# Patient Record
Sex: MALE | Race: WHITE | Employment: FULL TIME | URBAN - METROPOLITAN AREA
[De-identification: names, ages, dates, MRNs, and addresses within clinical notes are randomized per-mention and may not be internally consistent; named-entity substitution may affect disease eponyms.]

---

## 2017-03-29 ENCOUNTER — TELEPHONE (OUTPATIENT)
Dept: FAMILY MEDICINE CLINIC | Age: 52
End: 2017-03-29

## 2017-03-29 DIAGNOSIS — Z13.29 SCREENING FOR ENDOCRINE, METABOLIC AND IMMUNITY DISORDER: Primary | ICD-10-CM

## 2017-03-29 DIAGNOSIS — Z13.228 SCREENING FOR ENDOCRINE, METABOLIC AND IMMUNITY DISORDER: Primary | ICD-10-CM

## 2017-03-29 DIAGNOSIS — Z13.0 SCREENING FOR ENDOCRINE, METABOLIC AND IMMUNITY DISORDER: Primary | ICD-10-CM

## 2017-06-17 LAB
ALBUMIN SERPL-MCNC: 4.7 G/DL (ref 3.5–5.5)
ALBUMIN/GLOB SERPL: 2 {RATIO} (ref 1.2–2.2)
ALP SERPL-CCNC: 58 IU/L (ref 39–117)
ALT SERPL-CCNC: 21 IU/L (ref 0–44)
AST SERPL-CCNC: 17 IU/L (ref 0–40)
BILIRUB SERPL-MCNC: 0.3 MG/DL (ref 0–1.2)
BUN SERPL-MCNC: 17 MG/DL (ref 6–24)
BUN/CREAT SERPL: 17 (ref 9–20)
CALCIUM SERPL-MCNC: 9.9 MG/DL (ref 8.7–10.2)
CHLORIDE SERPL-SCNC: 101 MMOL/L (ref 96–106)
CHOLEST SERPL-MCNC: 209 MG/DL (ref 100–199)
CO2 SERPL-SCNC: 24 MMOL/L (ref 18–29)
CREAT SERPL-MCNC: 1 MG/DL (ref 0.76–1.27)
ERYTHROCYTE [DISTWIDTH] IN BLOOD BY AUTOMATED COUNT: 13.6 % (ref 12.3–15.4)
GLOBULIN SER CALC-MCNC: 2.3 G/DL (ref 1.5–4.5)
GLUCOSE SERPL-MCNC: 84 MG/DL (ref 65–99)
HCT VFR BLD AUTO: 43.5 % (ref 37.5–51)
HDLC SERPL-MCNC: 54 MG/DL
HGB BLD-MCNC: 15.4 G/DL (ref 12.6–17.7)
INTERPRETATION, 910389: NORMAL
LDLC SERPL CALC-MCNC: 129 MG/DL (ref 0–99)
MCH RBC QN AUTO: 29.8 PG (ref 26.6–33)
MCHC RBC AUTO-ENTMCNC: 35.4 G/DL (ref 31.5–35.7)
MCV RBC AUTO: 84 FL (ref 79–97)
PLATELET # BLD AUTO: 254 X10E3/UL (ref 150–379)
POTASSIUM SERPL-SCNC: 5 MMOL/L (ref 3.5–5.2)
PROT SERPL-MCNC: 7 G/DL (ref 6–8.5)
PSA SERPL-MCNC: 0.2 NG/ML (ref 0–4)
RBC # BLD AUTO: 5.16 X10E6/UL (ref 4.14–5.8)
REFLEX CRITERIA: NORMAL
SODIUM SERPL-SCNC: 142 MMOL/L (ref 134–144)
TRIGL SERPL-MCNC: 130 MG/DL (ref 0–149)
VLDLC SERPL CALC-MCNC: 26 MG/DL (ref 5–40)
WBC # BLD AUTO: 5.1 X10E3/UL (ref 3.4–10.8)

## 2017-06-20 ENCOUNTER — OFFICE VISIT (OUTPATIENT)
Dept: FAMILY MEDICINE CLINIC | Age: 52
End: 2017-06-20

## 2017-06-20 VITALS
BODY MASS INDEX: 29.29 KG/M2 | OXYGEN SATURATION: 97 % | HEART RATE: 70 BPM | WEIGHT: 221 LBS | HEIGHT: 73 IN | DIASTOLIC BLOOD PRESSURE: 83 MMHG | RESPIRATION RATE: 14 BRPM | SYSTOLIC BLOOD PRESSURE: 143 MMHG | TEMPERATURE: 97.4 F

## 2017-06-20 DIAGNOSIS — K64.4 EXTERNAL HEMORRHOIDS: ICD-10-CM

## 2017-06-20 DIAGNOSIS — Z00.00 ROUTINE GENERAL MEDICAL EXAMINATION AT A HEALTH CARE FACILITY: Primary | ICD-10-CM

## 2017-06-20 DIAGNOSIS — E78.5 HYPERLIPIDEMIA, UNSPECIFIED HYPERLIPIDEMIA TYPE: ICD-10-CM

## 2017-06-20 DIAGNOSIS — Z23 ENCOUNTER FOR IMMUNIZATION: ICD-10-CM

## 2017-06-20 DIAGNOSIS — Z86.79 HISTORY OF ATRIAL FIBRILLATION: ICD-10-CM

## 2017-06-20 NOTE — MR AVS SNAPSHOT
Visit Information Date & Time Provider Department Dept. Phone Encounter #  
 6/20/2017  2:00 PM Michael Robert  W [facility] [phone] [id number] Follow-up Instructions Return in about 1 year (around 6/20/2018) for Annual Exam - 30 minutes. Upcoming Health Maintenance Date Due DTaP/Tdap/Td series (1 - Tdap) 4/6/1987 INFLUENZA AGE 9 TO ADULT 8/1/2017 COLONOSCOPY 8/9/2021 Allergies as of 6/20/2017  Review Complete On: 6/20/2017 By: Michael Robert NP No Known Allergies Current Immunizations  Reviewed on 6/20/2017 Name Date Tdap  Incomplete Reviewed by Wayne Deng LPN on 4/55/1196 at  2:19 PM  
You Were Diagnosed With   
  
 Codes Comments Routine general medical examination at a health care facility    -  Primary ICD-10-CM: Z00.00 ICD-9-CM: V70.0 History of atrial fibrillation     ICD-10-CM: Z86.79 
ICD-9-CM: V12.59 Hyperlipidemia, unspecified hyperlipidemia type     ICD-10-CM: E78.5 ICD-9-CM: 272.4 External hemorrhoids     ICD-10-CM: K64.4 ICD-9-CM: 455.3 Encounter for immunization     ICD-10-CM: K15 ICD-9-CM: V03.89 Vitals BP Pulse Temp Resp Height(growth percentile) Weight(growth percentile) 143/83 (BP 1 Location: Left arm, BP Patient Position: Sitting) 70 97.4 °F (36.3 °C) (Oral) 14 6' 0.5\" (1.842 m) 221 lb (100.2 kg) SpO2 BMI Smoking Status 97% 29.56 kg/m2 Light Tobacco Smoker Vitals History BMI and BSA Data Body Mass Index Body Surface Area  
 29.56 kg/m 2 2.26 m 2 Preferred Pharmacy Pharmacy Name Phone [id number] - [place], [address] [facility] [phone] Your Updated Medication List  
  
   
This list is accurate as of: 6/20/17  3:01 PM.  Always use your most recent med list.  
  
  
  
  
 clonazePAM 0.5 mg tablet Commonly known as:  Vincent Wright  
 Take 1 Tab by mouth nightly as needed. Max Daily Amount: 0.5 mg.  
  
 finasteride 1 mg tablet Commonly known as:  Benito [place] Take 1 Tab by mouth daily. fluticasone 50 mcg/actuation nasal spray Commonly known as:  FLONASE  
2 sprays each nostril BID for 3 days then 2 sprays each nostril daily through summer  
  
 loratadine 10 mg tablet Commonly known as:  Vonna Hedger Take 10 mg by mouth.  
  
 multivitamin tablet Commonly known as:  ONE A DAY Take 1 tablet by mouth daily. naproxen 500 mg tablet Commonly known as:  NAPROSYN Take 1 Tab by mouth two (2) times daily (with meals). Take BID for 3 days upon start of pain then PRN thereafter We Performed the Following REFERRAL TO COLON AND RECTAL SURGERY [REF17 Custom] Comments:  
 Please evaluate patient for external hemorrhoids. TETANUS, DIPHTHERIA TOXOIDS AND ACELLULAR PERTUSSIS VACCINE (TDAP), IN INDIVIDS. >=7, IM [id number] CPT(R)] Follow-up Instructions Return in about 1 year (around 6/20/2018) for Annual Exam - 30 minutes. Referral Information Referral ID Referred By Referred To  
  
 [id number] [facility]   
   [address] [facility] [address] [place],, [address] Visits Status Start Date End Date 1 New Request 6/20/17 6/20/18 If your referral has a status of pending review or denied, additional information will be sent to support the outcome of this decision. Patient Instructions Well Visit, Men 48 to 72: Care Instructions Your Care Instructions Physical exams can help you stay healthy. Your doctor has checked your overall health and may have suggested ways to take good care of yourself. He or she also may have recommended tests. At home, you can help prevent illness with healthy eating, regular exercise, and other steps. Follow-up care is a key part of your treatment and safety.  Be sure to make and go to all appointments, and call your doctor if you are having problems. It's also a good idea to know your test results and keep a list of the medicines you take. How can you care for yourself at home? · Reach and stay at a healthy weight. This will lower your risk for many problems, such as obesity, diabetes, heart disease, and high blood pressure. · Get at least 30 minutes of exercise on most days of the week. Walking is a good choice. You also may want to do other activities, such as running, swimming, cycling, or playing tennis or team sports. · Do not smoke. Smoking can make health problems worse. If you need help quitting, talk to your doctor about stop-smoking programs and medicines. These can increase your chances of quitting for good. · Protect your skin from too much sun. When you're outdoors from 10 a.m. to 4 p.m., stay in the shade or cover up with clothing and a hat with a wide brim. Wear sunglasses that block UV rays. Even when it's cloudy, put broad-spectrum sunscreen (SPF 30 or higher) on any exposed skin. · See a dentist one or two times a year for checkups and to have your teeth cleaned. · Wear a seat belt in the car. · Limit alcohol to 2 drinks a day. Too much alcohol can cause health problems. Follow your doctor's advice about when to have certain tests. These tests can spot problems early. · Cholesterol. Your doctor will tell you how often to have this done based on your overall health and other things that can increase your risk for heart attack and stroke. · Blood pressure. Have your blood pressure checked during a routine doctor visit. Your doctor will tell you how often to check your blood pressure based on your age, your blood pressure results, and other factors. · Prostate exam. Talk to your doctor about whether you should have a blood test (called a PSA test) for prostate cancer.  Experts disagree on whether men should have this test. Some experts recommend that you discuss the benefits and risks of the test with your doctor. · Diabetes. Ask your doctor whether you should have tests for diabetes. · Vision. Some experts recommend that you have yearly exams for glaucoma and other age-related eye problems starting at age 48. · Hearing. Tell your doctor if you notice any change in your hearing. You can have tests to find out how well you hear. · Colon cancer. You should begin tests for colon cancer at age 48. You may have one of several tests. Your doctor will tell you how often to have tests based on your age and risk. Risks include whether you already had a precancerous polyp removed from your colon or whether your parent, brother, sister, or child has had colon cancer. · Heart attack and stroke risk. At least every 4 to 6 years, you should have your risk for heart attack and stroke assessed. Your doctor uses factors such as your age, blood pressure, cholesterol, and whether you smoke or have diabetes to show what your risk for a heart attack or stroke is over the next 10 years. · Abdominal aortic aneurysm. Ask your doctor whether you should have a test to check for an aneurysm. You may need a test if you ever smoked or if your parent, brother, sister, or child has had an aneurysm. When should you call for help? Watch closely for changes in your health, and be sure to contact your doctor if you have any problems or symptoms that concern you. Where can you learn more? Go to http://abel-pretty.info/. Enter U715 in the search box to learn more about \"Well Visit, Men 48 to 72: Care Instructions. \" Current as of: July 19, 2016 Content Version: 11.3 © 4862-6474 [employer]. Care instructions adapted under license by [employer] (which disclaims liability or warranty for this information).  If you have questions about a medical condition or this instruction, always ask your healthcare professional. [address] any warranty or liability for your use of this information. Introducing \[facility] [place] [facility]\"" & HEALTH SERVICES! Dear [place]: 
Thank you for requesting a ActualMeds account. Our records indicate that you already have an active ActualMeds account. You can access your account anytime at [email or web address] Did you know that you can access your hospital and ER discharge instructions at any time in ActualMeds? You can also review all of your test results from your hospital stay or ER visit. Additional Information If you have questions, please visit the Frequently Asked Questions section of the ActualMeds website at [email or web address] Remember, ActualMeds is NOT to be used for urgent needs. For medical emergencies, dial 911. Now available from your iPhone and Android! Please provide this summary of care documentation to your next provider. Your primary care clinician is listed as [facility]. If you have any questions after today's visit, please call [phone].

## 2017-06-20 NOTE — PATIENT INSTRUCTIONS

## 2017-06-20 NOTE — PROGRESS NOTES
Angie Zheng is a 47 y.o. male who was seen in clinic today (6/20/2017). Subjective:  Cardiovascular Review:  The patient has history of Atrial Fibrillation. Underwent ablation in 1991. Patient not currently seen by cardiology. Diet and Lifestyle: generally follows a low fat low cholesterol diet, generally follows a low sodium diet, sedentary  Home BP Monitoring: is not measured at home. Pertinent ROS: taking medications as instructed, no medication side effects noted, no TIA's, no chest pain on exertion, no dyspnea on exertion, no swelling of ankles. Colonoscopy in 2016 with repeat in 5 years. Prior to Admission medications    Medication Sig Start Date End Date Taking? Authorizing Provider   loratadine (CLARITIN) 10 mg tablet Take 10 mg by mouth. Yes Historical Provider   fluticasone (FLONASE) 50 mcg/actuation nasal spray 2 sprays each nostril BID for 3 days then 2 sprays each nostril daily through summer 6/14/16  Yes Vivia Riedel, DO   finasteride (PROPECIA) 1 mg tablet Take 1 Tab by mouth daily. 6/14/16  Yes Vivia Riedel, DO   clonazePAM (KLONOPIN) 0.5 mg tablet Take 1 Tab by mouth nightly as needed. Max Daily Amount: 0.5 mg. 10/15/15  Yes Vivia Riedel, DO   naproxen (NAPROSYN) 500 mg tablet Take 1 Tab by mouth two (2) times daily (with meals). Take BID for 3 days upon start of pain then PRN thereafter 2/12/15  Yes Vivia Riedel, DO   multivitamin (ONE A DAY) tablet Take 1 tablet by mouth daily. Yes Historical Provider          No Known Allergies        Review of Systems   Constitutional: Negative for malaise/fatigue and weight loss. Respiratory: Negative for shortness of breath. Cardiovascular: Negative for chest pain, palpitations and leg swelling. Gastrointestinal: Negative for heartburn. Musculoskeletal: Negative for back pain, joint pain and myalgias. Neurological: Negative for dizziness, weakness and headaches.    Psychiatric/Behavioral: Negative for depression. Objective:   Physical Exam   Constitutional: He is oriented to person, place, and time. He appears well-developed and well-nourished. No distress. HENT:   Right Ear: Tympanic membrane and ear canal normal.   Left Ear: Tympanic membrane and ear canal normal.   Nose: No mucosal edema. Right sinus exhibits no maxillary sinus tenderness and no frontal sinus tenderness. Left sinus exhibits no maxillary sinus tenderness and no frontal sinus tenderness. Mouth/Throat: Oropharynx is clear and moist.   Eyes: EOM are normal. Pupils are equal, round, and reactive to light. Neck: Normal range of motion. Neck supple. No JVD present. Carotid bruit is not present. No thyromegaly present. Cardiovascular: Normal rate, regular rhythm, normal heart sounds and intact distal pulses. Exam reveals no gallop and no friction rub. No murmur heard. Pulmonary/Chest: Effort normal and breath sounds normal. No respiratory distress. He has no decreased breath sounds. He has no wheezes. He has no rhonchi. Abdominal: Soft. Bowel sounds are normal. He exhibits no distension. There is no tenderness. Musculoskeletal: He exhibits no edema. Lymphadenopathy:     He has no cervical adenopathy. Neurological: He is alert and oriented to person, place, and time. Psychiatric: He has a normal mood and affect. His behavior is normal.   Nursing note and vitals reviewed. Visit Vitals    /83 (BP 1 Location: Left arm, BP Patient Position: Sitting)    Pulse 70    Temp 97.4 °F (36.3 °C) (Oral)    Resp 14    Ht 6' 0.5\" (1.842 m)    Wt 221 lb (100.2 kg)    SpO2 97%    BMI 29.56 kg/m2       Assessment & Plan:  Rere Larry was seen today for well male and labs.     Diagnoses and all orders for this visit:    Routine general medical examination at a health care facility  Well adult, reviewed routine screenings as well as healthy diet and lifestyle practices    History of atrial fibrillation  Asymptomatic but patient requests follow up cardiac exam.   -     REFERRAL TO CARDIOLOGY    Hyperlipidemia, unspecified hyperlipidemia type  ACC/AHA 10 year CVD risk calculation: 4.6%. Statin therapy not indicated. Reviewed diet and lifestyle changes. External hemorrhoids  -     REFERRAL TO COLON AND RECTAL SURGERY    Encounter for immunization  -     TETANUS, DIPHTHERIA TOXOIDS AND ACELLULAR PERTUSSIS VACCINE (TDAP), IN INDIVIDS. >=7, IM      I have discussed the diagnosis with the patient and the intended plan as seen in the above orders. The patient has received an after-visit summary along with patient information handout. I have discussed medication side effects and warnings with the patient as well. Follow-up Disposition:  Return in about 1 year (around 6/20/2018) for Annual Exam - 30 minutes.         Darci Berman NP

## 2017-06-20 NOTE — PROGRESS NOTES
Chief Complaint   Patient presents with    Well Male   [facility]     were done 2017     Identified pt with two pt identifiers (Name @ )    1. Have you been to the ER, urgent care clinic since your last visit? Hospitalized since your last visit? No    2. Have you seen or consulted any other health care providers outside of the [address] [place] [address] since your last visit? Include any pap smears or colon screening.  No     \"REVIEWED RECORD IN PREPARATION FOR VISIT AND HAVE OBTAINED NECESSARY DOCUMENTATION\"

## 2017-06-30 RX ORDER — FINASTERIDE 1 MG/1
TABLET, FILM COATED ORAL
Qty: 90 TAB | Refills: 2 | Status: SHIPPED | OUTPATIENT
Start: 2017-06-30 | End: 2018-06-25 | Stop reason: SDUPTHER

## 2017-06-30 NOTE — TELEPHONE ENCOUNTER
----- Message from Macie Tena sent at 6/29/2017  6:18 PM EDT -----  Regarding: Tito Bangura / refill  Pt is trying to get a refill of his  \"propecia\" to be sent to the [address] on [facility] and pt best contact number is  [phone].

## 2017-07-21 ENCOUNTER — OFFICE VISIT (OUTPATIENT)
Dept: CARDIOLOGY CLINIC | Age: 52
End: 2017-07-21

## 2017-07-21 VITALS
HEART RATE: 47 BPM | HEIGHT: 73 IN | DIASTOLIC BLOOD PRESSURE: 80 MMHG | SYSTOLIC BLOOD PRESSURE: 120 MMHG | OXYGEN SATURATION: 98 % | WEIGHT: 222 LBS | BODY MASS INDEX: 29.42 KG/M2

## 2017-07-21 DIAGNOSIS — I47.1 PSVT (PAROXYSMAL SUPRAVENTRICULAR TACHYCARDIA) (HCC): ICD-10-CM

## 2017-07-21 DIAGNOSIS — R06.09 DOE (DYSPNEA ON EXERTION): Primary | ICD-10-CM

## 2017-07-21 DIAGNOSIS — Z98.890 S/P ABLATION OF ACCESSORY BYPASS TRACT: ICD-10-CM

## 2017-07-21 DIAGNOSIS — R00.2 PALPITATIONS: ICD-10-CM

## 2017-07-21 DIAGNOSIS — R06.09 DOE (DYSPNEA ON EXERTION): ICD-10-CM

## 2017-07-21 DIAGNOSIS — E78.5 HYPERLIPIDEMIA, UNSPECIFIED HYPERLIPIDEMIA TYPE: ICD-10-CM

## 2017-07-21 DIAGNOSIS — I51.7 LVH (LEFT VENTRICULAR HYPERTROPHY): ICD-10-CM

## 2017-07-21 NOTE — PATIENT INSTRUCTIONS
A 30 day loop monitor has been ordered for you. This will be mailed to the address given to us. Please read over the instructions given to you about [employer] loop monitors. If you have any insurance questions regarding coverage and out of pocket cost please contact the number on the instructions. You will be scheduled for an echo. You will need to follow up in clinic with Dr. Reed Mclaughlin in 1 months.

## 2017-07-21 NOTE — PROGRESS NOTES
Cardiac Electrophysiology Consultation Note     Subjective:      Maire Schwab. is a 47 y.o. male patient who is seen for to establish cardiac care he has a hx of SVT & ablation kindly referred by Nasra Doyle NP. He was dx with SVT when he was 16 yo. He then started having daily episodes of SVT once he turned 29 yo. He then had an ablation of an accessory pathway in [place], [place] at [facility] . He was told he does not have WPW. Pmhx includes SVT ablation Jan 1995, hyperlipidemia. PSH tonsillectomy, hand surgery. He says he has always had a low heart rate. Nuclear stress test prior to the ablation   He denies DM, hypertension. He used to take simvastatin, but he stopped taking it - no particular reason  He does not have the episodes of the fast heart rates anymore since the ablation. He occasionally has symptoms of palpitations lasting a couple seconds. He works out doing physical work, he notices when he is sitting down or bending over and stands back up he will get dizzy. He says he does get SOB quickly when he is digging. No syncope. 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 Drinks \"alot of water\". No tobacco, quit smoking 10 years ago. He has an \"occasional cigarette\". , 24 yo son and 24 yo daughter. Family hx: Father CABG at age 79 yo (very healthy). Patient Active Problem List    Diagnosis Date Noted    Palpitations 07/21/2017    Serrated adenoma of colon 08/23/2016    Colon polyp 08/10/2016    Hair thinning 12/04/2014    Hyperlipidemia 12/04/2014    Allergic rhinitis 12/04/2014    History of anxiety disorder 12/04/2014    Insomnia 12/04/2014     Current Outpatient Prescriptions   Medication Sig Dispense Refill    UBIDECARENONE (COQ-10 PO) Take 200 mg by mouth daily.  finasteride (PROPECIA) 1 mg tablet TAKE 1 TAB BY MOUTH DAILY. 90 Tab 2    loratadine (CLARITIN) 10 mg tablet Take 10 mg by mouth.       fluticasone (FLONASE) 50 mcg/actuation nasal spray 2 sprays each nostril BID for 3 days then 2 sprays each nostril daily through summer 1 Bottle 2    clonazePAM (KLONOPIN) 0.5 mg tablet Take 1 Tab by mouth nightly as needed. Max Daily Amount: 0.5 mg. 30 Tab 0    naproxen (NAPROSYN) 500 mg tablet Take 1 Tab by mouth two (2) times daily (with meals). Take BID for 3 days upon start of pain then PRN thereafter 60 Tab 0    multivitamin (ONE A DAY) tablet Take 1 tablet by mouth daily. No Known Allergies  Past Medical History:   Diagnosis Date    Arrhythmia     Arthritis     Chronic pain     Contact dermatitis and other eczema, due to unspecified cause     GERD (gastroesophageal reflux disease)     Herniated disc, cervical     Hypercholesterolemia      Past Surgical History:   Procedure Laterality Date    CARDIAC SURG PROCEDURE UNLIST      HX AFIB ABLATION      HX ORTHOPAEDIC      pins in R hand     HX TONSILLECTOMY       Family History   Problem Relation Age of Onset    Arthritis-osteo Mother     Heart Disease Father     Cancer Father      bladder     Heart Disease Paternal Grandfather      Social History   Substance Use Topics    Smoking status: Light Tobacco Smoker    Smokeless tobacco: Never Used      Comment: occasional cigar or cigarette    Alcohol use Yes      Comment: occasionally         Review of Systems:   Constitutional: Negative for fever, chills, weight loss, +malaise/fatigue. HEENT: Negative for nosebleeds, vision changes. Respiratory: Negative for cough, hemoptysis, sputum production, and wheezing. Cardiovascular: Negative for chest pain, palpitations, orthopnea, claudication, leg swelling, syncope, and PND. + SOB  Gastrointestinal: Negative for nausea, vomiting, diarrhea, constipation, blood in stool and melena. Genitourinary: Negative for dysuria, and hematuria. Musculoskeletal: Negative for myalgias, arthralgia. Skin: Negative for rash. Heme: Does not bleed or bruise easily. Neurological: Negative for speech change and focal weakness     Objective:     Visit Vitals    /80 (BP 1 Location: Left arm, BP Patient Position: Sitting)    Pulse (!) 47    Ht 6' 0.5\" (1.842 m)    Wt 222 lb (100.7 kg)    SpO2 98%    BMI 29.69 kg/m2      Physical Exam:   Constitutional: well-nourished. No distress. Head: Normocephalic and atraumatic. Eyes: Pupils are equal, round  Neck: supple. No JVD present. Cardiovascular: slow rate, regular rhythm. Exam reveals no gallop and no friction rub. No murmur heard. Pulmonary/Chest: Effort normal and breath sounds normal. No wheezes. Abdominal: Soft, no tenderness. Musculoskeletal: no edema. Neurological: alert,oriented. Skin: Skin is warm and dry  Psychiatric: normal mood and affect. Behavior is normal. Judgment and thought content normal.      EKG: Marked sinus  Bradycardia   Voltage criteria for LVH    -Poor R-wave progression        Assessment/Plan:       ICD-10-CM ICD-9-CM    1. DANIEL (dyspnea on exertion) R06.09 786.09    2. S/P ablation of accessory bypass tract Z98.890 V45.89    3. Hyperlipidemia, unspecified hyperlipidemia type E78.5 272.4    4. LVH (left ventricular hypertrophy) I51.7 429.3    5. Palpitations R00.2 785.1 AMB POC EKG ROUTINE W/ 12 LEADS, INTER & REP     Will try to obtain records from [facility] is [place] not sure if they will still have these since it was in 1994/1995. Recommend stress echo d/t DANIEL. Will need to assess for IHSS, LVEF and if he is able to appropriately increase his HR. Follow up     Thank you for involving me in this patient's care and please call with further concerns or questions. Tomás Martin M.D.   Electrophysiology/Cardiology  [facility] [place]  [address], [place] [address] , [address]  [address]  [phone]

## 2017-07-21 NOTE — MR AVS SNAPSHOT
Visit Information Date & Time Provider Department Dept. Phone Encounter #  
 7/21/2017  9:00 AM Leon Lanza MD [facility] [place]ar [phone] [id number] Upcoming Health Maintenance Date Due INFLUENZA AGE 9 TO ADULT 8/1/2017 COLONOSCOPY 8/9/2021 DTaP/Tdap/Td series (2 - Td) 6/20/2027 Allergies as of 7/21/2017  Review Complete On: 7/21/2017 By: Leon Lanza MD  
 No Known Allergies Current Immunizations  Reviewed on 6/20/2017 Name Date Tdap 6/20/2017 Not reviewed this visit You Were Diagnosed With   
  
 Codes Comments DANIEL (dyspnea on exertion)    -  Primary ICD-10-CM: R06.09 
ICD-9-CM: 786.09 S/P ablation of accessory bypass tract     ICD-10-CM: Z98.890 ICD-9-CM: V45.89 Hyperlipidemia, unspecified hyperlipidemia type     ICD-10-CM: E78.5 ICD-9-CM: 272.4 LVH (left ventricular hypertrophy)     ICD-10-CM: I51.7 ICD-9-CM: 429.3 Palpitations     ICD-10-CM: R00.2 ICD-9-CM: 785.1 Vitals BP Pulse Height(growth percentile) Weight(growth percentile) SpO2 BMI  
 120/80 (BP 1 Location: Left arm, BP Patient Position: Sitting) (!) 47 6' 0.5\" (1.842 m) 222 lb (100.7 kg) 98% 29.69 kg/m2 Smoking Status Light Tobacco Smoker Vitals History BMI and BSA Data Body Mass Index Body Surface Area  
 29.69 kg/m 2 2.27 m 2 Preferred Pharmacy Pharmacy Name Phone [facility][id number] - [address] [phone] Your Updated Medication List  
  
   
This list is accurate as of: 7/21/17  9:59 AM.  Always use your most recent med list.  
  
  
  
  
 clonazePAM 0.5 mg tablet Commonly known as:  Marci Cord Take 1 Tab by mouth nightly as needed. Max Daily Amount: 0.5 mg. COQ-10 PO Take 200 mg by mouth daily. finasteride 1 mg tablet Commonly known as:  Inetta Cassia TAKE 1 TAB BY MOUTH DAILY. fluticasone 50 mcg/actuation nasal spray Commonly known as:  FLONASE  
2 sprays each nostril BID for 3 days then 2 sprays each nostril daily through summer  
  
 loratadine 10 mg tablet Commonly known as:  Leonel Means Take 10 mg by mouth.  
  
 multivitamin tablet Commonly known as:  ONE A DAY Take 1 tablet by mouth daily. naproxen 500 mg tablet Commonly known as:  NAPROSYN Take 1 Tab by mouth two (2) times daily (with meals). Take BID for 3 days upon start of pain then PRN thereafter We Performed the Following AMB POC EKG ROUTINE W/ 12 LEADS, INTER & REP [67836 CPT(R)] To-Do List   
 07/21/2017 ECHO:  ECHO TTE STRESS EXRCSE COMP W OR WO CONTR   
  
 07/21/2017 Cardiac Services:  LOOP MONITOR Patient Instructions A 30 day loop monitor has been ordered for you. This will be mailed to the address given to us. Please read over the instructions given to you about [employer] loop monitors. If you have any insurance questions regarding coverage and out of pocket cost please contact the number on the instructions. You will be scheduled for an echo. You will need to follow up in clinic with Dr. Nelli Zavala in 1 months. Introducing [facility]! Dear Rivka Gillis: 
Thank you for requesting a [employer] account. Our records indicate that you already have an active [employer] account. You can access your account anytime at [email or web address] Did you know that you can access your hospital and ER discharge instructions at any time in [employer]? You can also review all of your test results from your hospital stay or ER visit. Additional Information If you have questions, please visit the Frequently Asked Questions section of the [employer] website at [email or web address] Remember, [employer] is NOT to be used for urgent needs. For medical emergencies, dial 911. Now available from your iPhone and Android! Please provide this summary of care documentation to your next provider. Your primary care clinician is listed as Sujatha Pierce. If you have any questions after today's visit, please call [phone].

## 2017-07-22 NOTE — PROGRESS NOTES
Cardiac Electrophysiology Office Consultation Note     Subjective:      Ruben Fortune. is a 47 y.o. male patient who is seen for to establish cardiac care he has a hx of SVT & ablation   He is kindly referred by Crispin Schumacher NP. He was dx with SVT when he was 16 yo. He then started having daily episodes of SVT once he turned 29 yo. He then had an ablation of an accessory pathway in [place], [place] at [facility] . He was told he does not have WPW. Pmhx includes SVT ablation Jan 1995, hyperlipidemia. PSH tonsillectomy, hand surgery. He says he has always had a low heart rate. Nuclear stress test prior to the ablation   He denies DM, hypertension. He used to take simvastatin, but he stopped taking it - no particular reason  He does not have the episodes of the fast heart rates anymore since the ablation. He occasionally has symptoms of palpitations lasting a couple seconds. He works out doing physical work, he notices when he is sitting down or bending over and stands back up he will get dizzy. He says he does get SOB quickly when he is digging. No syncope. 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 Drinks \"alot of water\". No tobacco, quit smoking 10 years ago. He has an \"occasional cigarette\". , 26 yo son and 22 yo daughter. Family hx: Father CABG at age 77 yo (very healthy). Patient Active Problem List    Diagnosis Date Noted    Palpitations 07/21/2017    Serrated adenoma of colon 08/23/2016    Colon polyp 08/10/2016    Hair thinning 12/04/2014    Hyperlipidemia 12/04/2014    Allergic rhinitis 12/04/2014    History of anxiety disorder 12/04/2014    Insomnia 12/04/2014     Current Outpatient Prescriptions   Medication Sig Dispense Refill    UBIDECARENONE (COQ-10 PO) Take 200 mg by mouth daily.  finasteride (PROPECIA) 1 mg tablet TAKE 1 TAB BY MOUTH DAILY. 90 Tab 2    loratadine (CLARITIN) 10 mg tablet Take 10 mg by mouth.       fluticasone (FLONASE) 50 mcg/actuation nasal spray 2 sprays each nostril BID for 3 days then 2 sprays each nostril daily through summer 1 Bottle 2    clonazePAM (KLONOPIN) 0.5 mg tablet Take 1 Tab by mouth nightly as needed. Max Daily Amount: 0.5 mg. 30 Tab 0    naproxen (NAPROSYN) 500 mg tablet Take 1 Tab by mouth two (2) times daily (with meals). Take BID for 3 days upon start of pain then PRN thereafter 60 Tab 0    multivitamin (ONE A DAY) tablet Take 1 tablet by mouth daily. No Known Allergies  Past Medical History:   Diagnosis Date    Arrhythmia     Arthritis     Chronic pain     Contact dermatitis and other eczema, due to unspecified cause     GERD (gastroesophageal reflux disease)     Herniated disc, cervical     Hypercholesterolemia      Past Surgical History:   Procedure Laterality Date    CARDIAC SURG PROCEDURE UNLIST      HX AFIB ABLATION      HX ORTHOPAEDIC      pins in R hand     HX TONSILLECTOMY       Family History   Problem Relation Age of Onset    Arthritis-osteo Mother     Heart Disease Father     Cancer Father      bladder     Heart Disease Paternal Grandfather      Social History   Substance Use Topics    Smoking status: Light Tobacco Smoker    Smokeless tobacco: Never Used      Comment: occasional cigar or cigarette    Alcohol use Yes      Comment: occasionally         Review of Systems:   Constitutional: Negative for fever, chills, weight loss, +malaise/fatigue. HEENT: Negative for nosebleeds, vision changes. Respiratory: Negative for cough, hemoptysis, sputum production, and wheezing. Cardiovascular: Negative for chest pain, + palpitations, no orthopnea, claudication, leg swelling, syncope, and PND. + SOB  Gastrointestinal: Negative for nausea, vomiting, diarrhea, constipation, blood in stool and melena. Genitourinary: Negative for dysuria, and hematuria. Musculoskeletal: Negative for myalgias, arthralgia. Skin: Negative for rash.    Heme: Does not bleed or bruise easily. Neurological: Negative for speech change and focal weakness     Objective:     Visit Vitals    /80 (BP 1 Location: Left arm, BP Patient Position: Sitting)    Pulse (!) 47    Ht 6' 0.5\" (1.842 m)    Wt 222 lb (100.7 kg)    SpO2 98%    BMI 29.69 kg/m2      Physical Exam:   Constitutional: well-nourished. No distress. Head: Normocephalic and atraumatic. Eyes: Pupils are equal, round  Neck: supple. No JVD present. Cardiovascular: slow rate, regular rhythm. Exam reveals no gallop and no friction rub. No murmur heard. Pulmonary/Chest: Effort normal and breath sounds normal. No wheezes. Abdominal: Soft, no tenderness. Musculoskeletal: no edema. Neurological: alert,oriented. Skin: Skin is warm and dry  Psychiatric: normal mood and affect. Behavior is normal. Judgment and thought content normal.      EKG: Marked sinus  Bradycardia   Voltage criteria for LVH    -Poor R-wave progression        Assessment/Plan:       ICD-10-CM ICD-9-CM    1. DANIEL (dyspnea on exertion) R06.09 786.09 LOOP MONITOR      ECHO TTE STRESS EXRCSE COMP W OR WO CONTR   2. S/P ablation of accessory bypass tract Z98.890 V45.89 LOOP MONITOR      ECHO TTE STRESS EXRCSE COMP W OR WO CONTR   3. Hyperlipidemia, unspecified hyperlipidemia type E78.5 272.4 LOOP MONITOR      ECHO TTE STRESS EXRCSE COMP W OR WO CONTR   4. LVH (left ventricular hypertrophy) I51.7 429.3 LOOP MONITOR      ECHO TTE STRESS EXRCSE COMP W OR WO CONTR   5. Palpitations R00.2 785.1 AMB POC EKG ROUTINE W/ 12 LEADS, INTER & REP      LOOP MONITOR      ECHO TTE STRESS EXRCSE COMP W OR WO CONTR     Will try to obtain records from [facility] in [place] not sure if they will still have these since it was in 1994/1995. Recommend stress echo. Will need to assess for IHSS, LVEF and if he is able to appropriately increase his HR.  Need LVOT gradient with exercise  He agrees to proceed  External loop recorder for PVC, NSVT vs PAC/PAT  Follow up 4 weeks    Thank you for involving me in this patient's care and please call with further concerns or questions. Naresh Manuel M.D.   Electrophysiology/Cardiology  [facility] [place]  [facility] [address], [facility] [address] [place] [address] [address]  [phone]

## 2017-07-25 PROBLEM — I47.1 PSVT (PAROXYSMAL SUPRAVENTRICULAR TACHYCARDIA) (HCC): Status: ACTIVE | Noted: 2017-07-25

## 2017-07-25 NOTE — PROGRESS NOTES
Record from [place]: 12/15/1994 EP study and ablation of concealed left posteroseptal pathway  1995 repeat EP study without accessory pathway or SVT

## 2017-07-27 ENCOUNTER — CLINICAL SUPPORT (OUTPATIENT)
Dept: CARDIOLOGY CLINIC | Age: 52
End: 2017-07-27

## 2017-07-27 ENCOUNTER — TELEPHONE (OUTPATIENT)
Dept: CARDIOLOGY CLINIC | Age: 52
End: 2017-07-27

## 2017-07-27 DIAGNOSIS — R00.2 PALPITATIONS: ICD-10-CM

## 2017-07-27 DIAGNOSIS — E78.5 HYPERLIPIDEMIA, UNSPECIFIED HYPERLIPIDEMIA TYPE: ICD-10-CM

## 2017-07-27 DIAGNOSIS — Z98.890 S/P ABLATION OF ACCESSORY BYPASS TRACT: ICD-10-CM

## 2017-07-27 DIAGNOSIS — R06.09 DOE (DYSPNEA ON EXERTION): ICD-10-CM

## 2017-07-27 DIAGNOSIS — I51.7 LVH (LEFT VENTRICULAR HYPERTROPHY): ICD-10-CM

## 2017-07-27 NOTE — TELEPHONE ENCOUNTER
Spoke to pt & he is currently wearing loop monitor & having issues with the electrodes sticking. He sweats a lot & has already had to change them today. Informed to call [employer] to order more & see if they have any that are more adhesive. Pt stated he understands.

## 2017-07-27 NOTE — TELEPHONE ENCOUNTER
Patient states that he has questions about the loop monitor. Requests a call back at [phone]. Thanks!

## 2017-07-28 ENCOUNTER — TELEPHONE (OUTPATIENT)
Dept: CARDIOLOGY CLINIC | Age: 52
End: 2017-07-28

## 2017-07-28 NOTE — PROGRESS NOTES
No ischemia or infarct  LVOT gradient not high  Future Appointments  Date Time Provider Soraya Anguiano   8/31/2017 9:00 AM Destin Guzman  [address]

## 2017-07-28 NOTE — TELEPHONE ENCOUNTER
----- Message from Pernell Mead MD sent at 7/28/2017  4:08 PM EDT -----  No ischemia  Continue follow up and meds  8/31/2017  9:00 AM    Pernell Mead MD            [place] [address]

## 2017-07-28 NOTE — PROGRESS NOTES
No ischemia  Continue follow up and meds  8/31/2017  9:00 AM    Jasmin Marroquin MD            [place] [address]

## 2017-08-09 ENCOUNTER — DOCUMENTATION ONLY (OUTPATIENT)
Dept: CARDIOLOGY CLINIC | Age: 52
End: 2017-08-09

## 2017-08-09 NOTE — PROGRESS NOTES
Loop monitor shows PVC  Current Outpatient Prescriptions on File Prior to Visit   Medication Sig Dispense Refill    UBIDECARENONE (COQ-10 PO) Take 200 mg by mouth daily.  finasteride (PROPECIA) 1 mg tablet TAKE 1 TAB BY MOUTH DAILY. 90 Tab 2    loratadine (CLARITIN) 10 mg tablet Take 10 mg by mouth.  fluticasone (FLONASE) 50 mcg/actuation nasal spray 2 sprays each nostril BID for 3 days then 2 sprays each nostril daily through summer 1 Bottle 2    clonazePAM (KLONOPIN) 0.5 mg tablet Take 1 Tab by mouth nightly as needed. Max Daily Amount: 0.5 mg. 30 Tab 0    naproxen (NAPROSYN) 500 mg tablet Take 1 Tab by mouth two (2) times daily (with meals). Take BID for 3 days upon start of pain then PRN thereafter 60 Tab 0    multivitamin (ONE A DAY) tablet Take 1 tablet by mouth daily. No current facility-administered medications on file prior to visit.       I recommend toprol XL 25 mg every day and follow up  Future Appointments  Date Time Provider Soraya Anguiano   8/31/2017 9:00 AM Ross Lucero  [address]

## 2017-08-10 ENCOUNTER — TELEPHONE (OUTPATIENT)
Dept: CARDIOLOGY CLINIC | Age: 52
End: 2017-08-10

## 2017-08-10 NOTE — PROGRESS NOTES
Verified patient with two types of identifiers. Notified patient of results. He states that he has tried toprol before and did not like the way it made him feel. States that he will take it if he has to. Per Dr Yadi Gomez ok to hold off for now. Made appt for 8/15/17 with Dr Yadi Gomez to discuss. Patient verbalizes understanding. And will call with any questions or concerns.

## 2017-08-15 ENCOUNTER — OFFICE VISIT (OUTPATIENT)
Dept: CARDIOLOGY CLINIC | Age: 52
End: 2017-08-15

## 2017-08-15 VITALS
WEIGHT: 223.8 LBS | BODY MASS INDEX: 29.66 KG/M2 | HEART RATE: 57 BPM | DIASTOLIC BLOOD PRESSURE: 80 MMHG | HEIGHT: 73 IN | SYSTOLIC BLOOD PRESSURE: 130 MMHG | OXYGEN SATURATION: 99 %

## 2017-08-15 DIAGNOSIS — R00.2 PALPITATIONS: ICD-10-CM

## 2017-08-15 DIAGNOSIS — I47.1 PSVT (PAROXYSMAL SUPRAVENTRICULAR TACHYCARDIA) (HCC): ICD-10-CM

## 2017-08-15 DIAGNOSIS — Z98.890 HX OF PRIOR ABLATION TREATMENT: ICD-10-CM

## 2017-08-15 DIAGNOSIS — I49.3 SYMPTOMATIC PVCS: Primary | ICD-10-CM

## 2017-08-15 RX ORDER — METOPROLOL SUCCINATE 25 MG/1
25 TABLET, EXTENDED RELEASE ORAL DAILY
Qty: 30 TAB | Refills: 12 | Status: SHIPPED | OUTPATIENT
Start: 2017-08-15 | End: 2018-08-22

## 2017-08-15 NOTE — MR AVS SNAPSHOT
Visit Information Date & Time Provider Department Dept. Phone Encounter #  
 8/15/2017  3:40 PM Nicole Ibanez MD [facility] Brionna Butler [phone] [id number] Follow-up Instructions Return in about 1 year (around 8/15/2018), or if symptoms worsen or fail to improve. Upcoming Health Maintenance Date Due INFLUENZA AGE 9 TO ADULT 8/1/2017 COLONOSCOPY 8/9/2021 DTaP/Tdap/Td series (2 - Td) 6/20/2027 Allergies as of 8/15/2017  Review Complete On: 8/15/2017 By: Nicole Ibanez MD  
 No Known Allergies Current Immunizations  Reviewed on 6/20/2017 Name Date Tdap 6/20/2017 Not reviewed this visit You Were Diagnosed With   
  
 Codes Comments Symptomatic PVCs    -  Primary ICD-10-CM: I49.3 ICD-9-CM: 427.69 Hx of prior ablation treatment     ICD-10-CM: Z98.890 ICD-9-CM: V15.29 PSVT (paroxysmal supraventricular tachycardia) (HCC)     ICD-10-CM: I47.1 ICD-9-CM: 427.0 Palpitations     ICD-10-CM: R00.2 ICD-9-CM: 785.1 Vitals BP Pulse Height(growth percentile) Weight(growth percentile) SpO2 BMI  
 130/80 (BP 1 Location: Right arm, BP Patient Position: Sitting) (!) 57 6' 0.5\" (1.842 m) 223 lb 12.8 oz (101.5 kg) 99% 29.94 kg/m2 Smoking Status Light Tobacco Smoker Vitals History BMI and BSA Data Body Mass Index Body Surface Area  
 29.94 kg/m 2 2.28 m 2 Preferred Pharmacy Pharmacy Name Phone [facility][id number] - [address] [phone] Your Updated Medication List  
  
   
This list is accurate as of: 8/15/17  4:29 PM.  Always use your most recent med list.  
  
  
  
  
 clonazePAM 0.5 mg tablet Commonly known as:  Aditi Fearing Take 1 Tab by mouth nightly as needed. Max Daily Amount: 0.5 mg. COQ-10 PO Take 200 mg by mouth daily. finasteride 1 mg tablet Commonly known as:  Reino Piano TAKE 1 TAB BY MOUTH DAILY. fluticasone 50 mcg/actuation nasal spray Commonly known as:  FLONASE  
2 sprays each nostril BID for 3 days then 2 sprays each nostril daily through summer  
  
 loratadine 10 mg tablet Commonly known as:  Sabrina Arlington Take 10 mg by mouth.  
  
 metoprolol succinate 25 mg XL tablet Commonly known as:  TOPROL-XL Take 1 Tab by mouth daily. multivitamin tablet Commonly known as:  ONE A DAY Take 1 tablet by mouth daily. naproxen 500 mg tablet Commonly known as:  NAPROSYN Take 1 Tab by mouth two (2) times daily (with meals). Take BID for 3 days upon start of pain then PRN thereafter Prescriptions Sent to Pharmacy Refills  
 metoprolol succinate (TOPROL-XL) 25 mg XL tablet 12 Sig: Take 1 Tab by mouth daily. Class: Normal  
 Pharmacy: [address] [place] [address] #: [phone] Route: Oral  
  
Follow-up Instructions Return in about 1 year (around 8/15/2018), or if symptoms worsen or fail to improve. Patient Instructions You will need to follow up in clinic with Dr. Chelo Mcqueen in 1 year. Introducing [facility]! Dear Maryam: 
Thank you for requesting a [employer] account. Our records indicate that you already have an active [employer] [facility] account. You can access your account anytime at [email or web address] Did you know that you can access your hospital and ER discharge instructions at any time in [facility]? You can also review all of your test results from your hospital stay or ER visit. Additional Information If you have questions, please visit the Frequently Asked Questions section of the [employer] website at [email or web address] Remember, [facility] is NOT to be used for urgent needs. For medical emergencies, dial 911. Now available from your iPhone and Android! Please provide this summary of care documentation to your next provider. Your primary care clinician is listed as Daina Dee. If you have any questions after today's visit, please call [phone].

## 2017-08-15 NOTE — PROGRESS NOTES
.       Cardiac Electrophysiology Office Consultation Note     Subjective:      Delmy Esquivel. is a 47 y.o. male patient who is seen for to establish cardiac care he has a hx of SVT & ablation   He is kindly referred by Lucy Mcgrath NP. He was dx with SVT when he was 16 yo. He then started having daily episodes of SVT once he turned 27 yo. He then had an ablation of an accessory pathway in [place], [place] at [facility] . Record from [place]: 12/15/1994 EP study and ablation of concealed left posteroseptal pathway  1995 repeat EP study without accessory pathway or SVT  He was told he does not have WPW. Pmhx includes SVT ablation Jan 1995, hyperlipidemia. PSH tonsillectomy, hand surgery. He says he has always had a low heart rate. Nuclear stress test prior to the ablation   He denies DM, hypertension. He used to take simvastatin, but he stopped taking it - no particular reason  He does not have the episodes of the fast heart rates anymore since the ablation. He occasionally has symptoms of palpitations lasting a couple seconds. He works out doing physical work, he notices when he is sitting down or bending over and stands back up he will get dizzy. He says he does get SOB quickly when he is digging. No syncope. Interim hx:  He is here today for follow up. He wore a external loop monitor that showed PVC. He had a normal stress echo, normal LVEF. Anant Ball He was only able to wear the loop monitor for 2 weeks because he developed a rash from the patches. He does occasionally get lightheaded when he changes position. He did not start the Toprol because he has taken it \"on and off\" for several years and he has not been able to tolerate it d/t fatigue. 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 Drinks \"alot of water\". No tobacco, quit smoking 10 years ago. He has an \"occasional cigarette\". , 24 yo son and 24 yo daughter.      Family hx: Father CABG at age 77 yo (very healthy). Patient Active Problem List    Diagnosis Date Noted    PSVT (paroxysmal supraventricular tachycardia) (HCC) 07/25/2017    Palpitations 07/21/2017    Serrated adenoma of colon 08/23/2016    Colon polyp 08/10/2016    Hair thinning 12/04/2014    Hyperlipidemia 12/04/2014    Allergic rhinitis 12/04/2014    History of anxiety disorder 12/04/2014    Insomnia 12/04/2014     Current Outpatient Prescriptions   Medication Sig Dispense Refill    metoprolol succinate (TOPROL-XL) 25 mg XL tablet Take 1 Tab by mouth daily. 30 Tab 12    UBIDECARENONE (COQ-10 PO) Take 200 mg by mouth daily.  finasteride (PROPECIA) 1 mg tablet TAKE 1 TAB BY MOUTH DAILY. 90 Tab 2    loratadine (CLARITIN) 10 mg tablet Take 10 mg by mouth.  fluticasone (FLONASE) 50 mcg/actuation nasal spray 2 sprays each nostril BID for 3 days then 2 sprays each nostril daily through summer 1 Bottle 2    clonazePAM (KLONOPIN) 0.5 mg tablet Take 1 Tab by mouth nightly as needed. Max Daily Amount: 0.5 mg. 30 Tab 0    naproxen (NAPROSYN) 500 mg tablet Take 1 Tab by mouth two (2) times daily (with meals). Take BID for 3 days upon start of pain then PRN thereafter 60 Tab 0    multivitamin (ONE A DAY) tablet Take 1 tablet by mouth daily.        No Known Allergies  Past Medical History:   Diagnosis Date    Arrhythmia     Arthritis     Chronic pain     Contact dermatitis and other eczema, due to unspecified cause     GERD (gastroesophageal reflux disease)     Herniated disc, cervical     Hypercholesterolemia      Past Surgical History:   Procedure Laterality Date    CARDIAC SURG PROCEDURE UNLIST      HX AFIB ABLATION      HX ORTHOPAEDIC      pins in R hand     HX TONSILLECTOMY       Family History   Problem Relation Age of Onset   [address] Arthritis-osteo Mother     Heart Disease Father     Cancer Father      bladder     Heart Disease Paternal Grandfather      Social History   Substance Use Topics    Smoking status: Light Tobacco Smoker    Smokeless tobacco: Never Used      Comment: occasional cigar or cigarette    Alcohol use Yes      Comment: occasionally         Review of Systems:   Constitutional: Negative for fever, chills, weight loss, +malaise/fatigue. HEENT: Negative for nosebleeds, vision changes. Respiratory: Negative for cough, hemoptysis, sputum production, and wheezing. Cardiovascular: Negative for chest pain, + palpitations, no orthopnea, claudication, leg swelling, syncope, and PND. Gastrointestinal: Negative for nausea, vomiting, diarrhea, constipation, blood in stool and melena. Genitourinary: Negative for dysuria, and hematuria. Musculoskeletal: Negative for myalgias, arthralgia. Skin: Negative for rash. Heme: Does not bleed or bruise easily. Neurological: Negative for speech change and focal weakness     Objective:     Visit Vitals    /80 (BP 1 Location: Right arm, BP Patient Position: Sitting)    Pulse (!) 57    Ht 6' 0.5\" (1.842 m)    Wt 223 lb 12.8 oz (101.5 kg)    SpO2 99%    BMI 29.94 kg/m2      Physical Exam:   Constitutional: well-nourished. No distress. Head: Normocephalic and atraumatic. Eyes: Pupils are equal, round  Neck: supple. No JVD present. Cardiovascular: slow rate, regular rhythm. Exam reveals no gallop and no friction rub. No murmur heard. Pulmonary/Chest: Effort normal and breath sounds normal. No wheezes. Abdominal: Soft, no tenderness. Musculoskeletal: no edema. Neurological: alert,oriented. Skin: Skin is warm and dry  Psychiatric: normal mood and affect. Behavior is normal. Judgment and thought content normal.      EKG, old: Marked sinus  Bradycardia   Voltage criteria for LVH    -Poor R-wave progression        Assessment/Plan:       ICD-10-CM ICD-9-CM    1. Symptomatic PVCs I49.3 427.69    2. Hx of prior ablation treatment Z98.890 V15.29    3. PSVT (paroxysmal supraventricular tachycardia) (HCC) I47.1 427.0    4.  Palpitations R00.2 785.1      Reviewed loop monitor and stress echo. He will send to the loop monitor back in, he had a rash with the patches. He agrees to take the Toprol to take prn, he does not like to take this while he is working d/t fatigue. Follow up in 1 year or prn    Thank you for involving me in this patient's care and please call with further concerns or questions. Carlos Cast M.D.   Electrophysiology/Cardiology  [facility] [place]  [address], [address], Paul Castillo [address] [place] [address]  [phone]

## 2017-08-15 NOTE — PROGRESS NOTES
Cardiac Electrophysiology Office Note     Subjective:      Elke Allen is a 47 y.o. male patient who had a hx of SVT & ablation   He was kindly referred by Savita Metzger NP. He was dx with SVT when he was 16 yo. He then started having daily episodes of SVT once he turned 27 yo. He then had an ablation of an accessory pathway in [place], [place] at [facility] . Record from [place]: 12/15/1994 EP study and ablation of concealed left posteroseptal pathway  1995 repeat EP study without accessory pathway or SVT     Pmhx includes SVT ablation Jan 1995, hyperlipidemia. PSH tonsillectomy, hand surgery. He says he has always had a low heart rate. Nuclear stress test prior to the ablation   He denies DM, hypertension. He used to take simvastatin, but he stopped taking it - no particular reason  He does not have the episodes of the fast heart rates anymore since the ablation. He occasionally has symptoms of palpitations lasting a couple seconds. He works out doing physical work, he notices when he is sitting down or bending over and stands back up he will get dizzy. He says he does get SOB quickly when he is digging. No syncope. Since the past visit stress echo was normal  Interim hx:    He wore a external loop monitor that showed unifocal PVC. He was only able to wear the loop monitor for 2 weeks because he developed a rash from the patches. He does occasionally get lightheaded when he changes position. He did not start the Toprol because he has taken it \"on and off\" for several years and he has not been able to tolerate it d/t fatigue. 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 Drinks \"a lot of water\". No tobacco, quit smoking 10 years ago. He has an \"occasional cigarette\". , 24 yo son and 22 yo daughter. Family hx: Father CABG at age 77 yo (very healthy).        Patient Active Problem List    Diagnosis Date Noted    PSVT (paroxysmal supraventricular tachycardia) ([facility] [address] 07/25/2017    Palpitations 07/21/2017    Serrated adenoma of colon 08/23/2016    Colon polyp 08/10/2016    Hair thinning 12/04/2014    Hyperlipidemia 12/04/2014    Allergic rhinitis 12/04/2014    History of anxiety disorder 12/04/2014    Insomnia 12/04/2014     Current Outpatient Prescriptions   Medication Sig Dispense Refill    metoprolol succinate (TOPROL-XL) 25 mg XL tablet Take 1 Tab by mouth daily. 30 Tab 12    UBIDECARENONE (COQ-10 PO) Take 200 mg by mouth daily.  finasteride (PROPECIA) 1 mg tablet TAKE 1 TAB BY MOUTH DAILY. 90 Tab 2    loratadine (CLARITIN) 10 mg tablet Take 10 mg by mouth.  fluticasone (FLONASE) 50 mcg/actuation nasal spray 2 sprays each nostril BID for 3 days then 2 sprays each nostril daily through summer 1 Bottle 2    clonazePAM (KLONOPIN) 0.5 mg tablet Take 1 Tab by mouth nightly as needed. Max Daily Amount: 0.5 mg. 30 Tab 0    naproxen (NAPROSYN) 500 mg tablet Take 1 Tab by mouth two (2) times daily (with meals). Take BID for 3 days upon start of pain then PRN thereafter 60 Tab 0    multivitamin (ONE A DAY) tablet Take 1 tablet by mouth daily.        No Known Allergies  Past Medical History:   Diagnosis Date    Arrhythmia     Arthritis     Chronic pain     Contact dermatitis and other eczema, due to unspecified cause     GERD (gastroesophageal reflux disease)     Herniated disc, cervical     Hypercholesterolemia      Past Surgical History:   Procedure Laterality Date    CARDIAC SURG PROCEDURE UNLIST      HX AFIB ABLATION      HX ORTHOPAEDIC      pins in R hand     HX TONSILLECTOMY       Family History   Problem Relation Age of Onset    Arthritis-osteo Mother     Heart Disease Father     Cancer Father      bladder     Heart Disease Paternal Grandfather      Social History   Substance Use Topics    Smoking status: Light Tobacco Smoker    Smokeless tobacco: Never Used      Comment: occasional cigar or cigarette    Alcohol use Yes Comment: occasionally         Review of Systems:   Constitutional: Negative for fever, chills, weight loss, +malaise/fatigue. HEENT: Negative for nosebleeds, vision changes. Respiratory: Negative for cough, hemoptysis, sputum production, and wheezing. Cardiovascular: Negative for chest pain, + palpitations, no orthopnea, claudication, leg swelling, syncope, and PND. Gastrointestinal: Negative for nausea, vomiting, diarrhea, constipation, blood in stool and melena. Genitourinary: Negative for dysuria, and hematuria. Musculoskeletal: Negative for myalgias, arthralgia. Skin: Negative for rash. Heme: Does not bleed or bruise easily. Neurological: Negative for speech change and focal weakness     Objective:     Visit Vitals    /80 (BP 1 Location: Right arm, BP Patient Position: Sitting)    Pulse (!) 57    Ht 6' 0.5\" (1.842 m)    Wt 223 lb 12.8 oz (101.5 kg)    SpO2 99%    BMI 29.94 kg/m2      Physical Exam:   Constitutional: well-nourished. No distress. Head: Normocephalic and atraumatic. Eyes: Pupils are equal, round  Neck: supple. No JVD present. Cardiovascular: slow rate, regular rhythm. Exam reveals no gallop and no friction rub. No murmur heard. Pulmonary/Chest: Effort normal and breath sounds normal. No wheezes. Abdominal: Soft, no tenderness. Musculoskeletal: no edema. Neurological: alert,oriented. Skin: Skin is warm and dry  Psychiatric: normal mood and affect. Behavior is normal. Judgment and thought content normal.      EKG, old: Marked sinus  Bradycardia   Voltage criteria for LVH    -Poor R-wave progression        Assessment/Plan:       ICD-10-CM ICD-9-CM    1. Symptomatic PVCs I49.3 427.69    2. Hx of prior ablation treatment Z98.890 V15.29    3. PSVT (paroxysmal supraventricular tachycardia) (HCC) I47.1 427.0    4.  Palpitations R00.2 785.1      Reviewed loop monitor and stress echocardiogram result to him  He will send to the loop monitor back in, he had a rash with the patches. He agrees to take the Toprol to take prn, he does not like to take this while he is working d/t fatigue. I recommend toprol daily    Follow up in 1 year or prn    Thank you for involving me in this patient's care and please call with further concerns or questions. Naida Harada, M.D.   Electrophysiology/Cardiology  [facility] [place]  [facility] [address], [place] [address] [facility], [address], [address] [place] [address]  [phone]

## 2018-01-30 ENCOUNTER — CLINICAL SUPPORT (OUTPATIENT)
Dept: FAMILY MEDICINE CLINIC | Age: 53
End: 2018-01-30

## 2018-01-30 DIAGNOSIS — Z23 ENCOUNTER FOR IMMUNIZATION: Primary | ICD-10-CM

## 2018-01-30 NOTE — PROGRESS NOTES
Chief Complaint   Patient presents with    Immunization/Injection     flu shot     Wants Flu shot  Patient denies egg allergy, never had reaction to flu injection in past, not allergic thimerosol mercury based component, denies pregnancy, never had Guillain Barr's syndrome , denies fever.    Signed form and scan

## 2018-05-21 ENCOUNTER — OFFICE VISIT (OUTPATIENT)
Dept: FAMILY MEDICINE CLINIC | Age: 53
End: 2018-05-21

## 2018-05-21 VITALS
DIASTOLIC BLOOD PRESSURE: 82 MMHG | RESPIRATION RATE: 18 BRPM | HEART RATE: 55 BPM | TEMPERATURE: 98.8 F | SYSTOLIC BLOOD PRESSURE: 126 MMHG | WEIGHT: 227.2 LBS | OXYGEN SATURATION: 98 % | BODY MASS INDEX: 30.11 KG/M2 | HEIGHT: 73 IN

## 2018-05-21 DIAGNOSIS — J30.1 SEASONAL ALLERGIC RHINITIS DUE TO POLLEN: ICD-10-CM

## 2018-05-21 DIAGNOSIS — J01.00 ACUTE MAXILLARY SINUSITIS, RECURRENCE NOT SPECIFIED: Primary | ICD-10-CM

## 2018-05-21 LAB
QUICKVUE INFLUENZA TEST: NEGATIVE
VALID INTERNAL CONTROL?: YES

## 2018-05-21 RX ORDER — FLUTICASONE PROPIONATE 50 MCG
SPRAY, SUSPENSION (ML) NASAL
Qty: 1 BOTTLE | Refills: 2 | Status: SHIPPED | OUTPATIENT
Start: 2018-05-21

## 2018-05-21 RX ORDER — AMOXICILLIN AND CLAVULANATE POTASSIUM 500; 125 MG/1; MG/1
1 TABLET, FILM COATED ORAL 2 TIMES DAILY
Qty: 14 TAB | Refills: 0 | Status: SHIPPED | OUTPATIENT
Start: 2018-05-25 | End: 2018-06-01

## 2018-05-21 NOTE — PROGRESS NOTES
Chief Complaint   Patient presents with   Daysi Garciara since Saturday, with some chest congestion. Cough is productive with yellowish to green sputum. 1. Have you been to the ER, urgent care clinic since your last visit? Hospitalized since your last visit? No    2. Have you seen or consulted any other health care providers outside of the [address] [place] [address] since your last visit? Include any pap smears or colon screening.  No

## 2018-05-21 NOTE — PATIENT INSTRUCTIONS
For your symptoms: Your symptoms may improve with an oral antihistamine. These are available over the counter and include:  Loratadine/claritin  Cetirizine/Zyrtec  Fexofenadine/Allegra  Levocetirizine/Xyzal    · Your symptoms may improve with a nasal steroid. These are available over the counter and include:  · Flonase (aka fluticasone)  · Nasocort (aka triamcinolone)  · Nasonex (aka mometasone)  · Rhinocort (aka budesonide)    · Increase fluid intake, especially water to thin mucous and boost the immune system. · Avoid sugar and dairy while congested since they thicken mucous. · Get plenty of rest!    · Gargle 3 times daily and as needed in Listerine or warm salt water vinegar solutions (1 tsp salt, 1 tsp vinegar in 1 cup lukewarm water.)    · Use OTC nasal saline spray up each nostril four times daily. You could also consider using a netipot with distilled water. · Use humidifier at bedtime. · Use OTC Mucinex 600 mg twice daily to loosen mucous. · Use OTC Tylenol (up to 650mg every 6 hours) as needed for pain, fever or headaches. ·   Return to the doctor for evaluation:  · If mucous is consistently discolored yellow or green throughout the day for more than a week  · If you develop worsening facial pain  · If you develop a fever that will not go away  · If your symptoms worsen instead of improve           Saline Nasal Washes: Care Instructions  Your Care Instructions  Saline nasal washes help keep the nasal passages open by washing out thick or dried mucus. This simple remedy can help relieve symptoms of allergies, sinusitis, and colds. It also can make the nose feel more comfortable by keeping the mucous membranes moist. You may notice a little burning sensation in your nose the first few times you use the solution, but this usually gets better in a few days. Follow-up care is a key part of your treatment and safety.  Be sure to make and go to all appointments, and call your doctor if you are having problems. It's also a good idea to know your test results and keep a list of the medicines you take. How can you care for yourself at home? · You can buy premixed saline solution in a squeeze bottle or other sinus rinse products at a drugstore. Read and follow the instructions on the label. · You also can make your own saline solution by adding 1 teaspoon of salt and 1 teaspoon of baking soda to 2 cups of distilled water. · If you use a homemade solution, pour a small amount into a clean bowl. Using a rubber bulb syringe, squeeze the syringe and place the tip in the salt water. Pull a small amount of the salt water into the syringe by relaxing your hand. · Sit down with your head tilted slightly back. Do not lie down. Put the tip of the bulb syringe or the squeeze bottle a little way into one of your nostrils. Gently drip or squirt a few drops into the nostril. Repeat with the other nostril. Some sneezing and gagging are normal at first.  · Gently blow your nose. · Wipe the syringe or bottle tip clean after each use. · Repeat this 2 or 3 times a day. · Use nasal washes gently if you have nosebleeds often. When should you call for help? Watch closely for changes in your health, and be sure to contact your doctor if:  ? · You often get nosebleeds. ? · You have problems doing the nasal washes. Where can you learn more? Go to http://abel-pretty.info/. Enter 740 [id number] in the search box to learn more about \"Saline Nasal Washes: Care Instructions. \"  Current as of: May 12, 2017  Content Version: 11.4  © 4525-0142 [employer]. Care instructions adapted under license by [employer] (which disclaims liability or warranty for this information). If you have questions about a medical condition or this instruction, always ask your healthcare professional. [address] any warranty or liability for your use of this information.        Sinusitis: Care Instructions  Your Care Instructions    Sinusitis is an infection of the lining of the sinus cavities in your head. Sinusitis often follows a cold. It causes pain and pressure in your head and face. In most cases, sinusitis gets better on its own in 1 to 2 weeks. But some mild symptoms may last for several weeks. Sometimes antibiotics are needed. Follow-up care is a key part of your treatment and safety. Be sure to make and go to all appointments, and call your doctor if you are having problems. It's also a good idea to know your test results and keep a list of the medicines you take. How can you care for yourself at home? · Take an over-the-counter pain medicine, such as acetaminophen (Tylenol), ibuprofen (Advil, Motrin), or naproxen (Aleve). Read and follow all instructions on the label. · If the doctor prescribed antibiotics, take them as directed. Do not stop taking them just because you feel better. You need to take the full course of antibiotics. · Be careful when taking over-the-counter cold or flu medicines and Tylenol at the same time. Many of these medicines have acetaminophen, which is Tylenol. Read the labels to make sure that you are not taking more than the recommended dose. Too much acetaminophen (Tylenol) can be harmful. · Breathe warm, moist air from a steamy shower, a hot bath, or a sink filled with hot water. Avoid cold, dry air. Using a humidifier in your home may help. Follow the directions for cleaning the machine. · Use saline (saltwater) nasal washes to help keep your nasal passages open and wash out mucus and bacteria. You can buy saline nose drops at a grocery store or drugstore. Or you can make your own at home by adding 1 teaspoon of salt and 1 teaspoon of baking soda to 2 cups of distilled water. If you make your own, fill a bulb syringe with the solution, insert the tip into your nostril, and squeeze gently. Macel Halt your nose.   · Put a hot, wet towel or a warm gel pack on your face 3 or 4 times a day for 5 to 10 minutes each time. · Try a decongestant nasal spray like oxymetazoline (Afrin). Do not use it for more than 3 days in a row. Using it for more than 3 days can make your congestion worse. When should you call for help? Call your doctor now or seek immediate medical care if:  ? · You have new or worse swelling or redness in your face or around your eyes. ? · You have a new or higher fever. ? Watch closely for changes in your health, and be sure to contact your doctor if:  ? · You have new or worse facial pain. ? · The mucus from your nose becomes thicker (like pus) or has new blood in it. ? · You are not getting better as expected. Where can you learn more? Go to http://abel-pretty.info/. Enter U146 in the search box to learn more about \"Sinusitis: Care Instructions. \"  Current as of: May 12, 2017  Content Version: 11.4  © 4488-7312 [employer], [employer]. Care instructions adapted under license by [employer] (which disclaims liability or warranty for this information). If you have questions about a medical condition or this instruction, always ask your healthcare professional. [address] any warranty or liability for your use of this information.

## 2018-05-21 NOTE — MR AVS SNAPSHOT
[address] 
[phone] Patient: Lisa Ann Sr. MRN: [id number] :1965 Visit Information Date & Time Provider Department Dept. Phone Encounter #  
 2018 11:30 AM Raul Garcia MD [address] [place] [address] [phone] [id number] Follow-up Instructions Return if symptoms worsen or fail to improve, for Follow Up. Upcoming Health Maintenance Date Due Influenza Age 5 to Adult 2018 COLONOSCOPY 2021 DTaP/Tdap/Td series (2 - Td) 2027 Allergies as of 2018  Review Complete On: 2018 By: Raul Garcia MD  
 No Known Allergies Current Immunizations  Reviewed on 2018 Name Date Influenza Vaccine (Quad) PF 2018 Tdap 2017 Not reviewed this visit You Were Diagnosed With   
  
 Codes Comments Acute maxillary sinusitis, recurrence not specified    -  Primary ICD-10-CM: J01.00 ICD-9-CM: 461.0 Vitals BP Pulse Temp Resp Height(growth percentile) Weight(growth percentile) 126/82 (BP 1 Location: Left arm, BP Patient Position: Sitting) (!) 55 98.8 °F (37.1 °C) (Oral) 18 6' 0.5\" (1.842 m) 227 lb 3.2 oz (103.1 kg) SpO2 BMI Smoking Status 98% 30.39 kg/m2 Light Tobacco Smoker BMI and BSA Data Body Mass Index Body Surface Area  
 30.39 kg/m 2 2.3 m 2 Preferred Pharmacy Pharmacy Name Phone [id number] - [place] [id number] [facility] [phone] Your Updated Medication List  
  
   
This list is accurate as of 18 11:47 AM.  Always use your most recent med list.  
  
  
  
  
 amoxicillin-clavulanate 500-125 mg per tablet Commonly known as:  AUGMENTIN Take 1 Tab by mouth two (2) times a day for 7 days. Start taking on:  2018  
  
 clonazePAM 0.5 mg tablet Commonly known as:  Joseluis Ritchie  
 Take 1 Tab by mouth nightly as needed. Max Daily Amount: 0.5 mg. COQ-10 PO Take 200 mg by mouth daily. finasteride 1 mg tablet Commonly known as:  Amandafranceamberly Santamaria TAKE 1 TAB BY MOUTH DAILY. fluticasone 50 mcg/actuation nasal spray Commonly known as:  FLONASE  
2 sprays each nostril BID for 3 days then 2 sprays each nostril daily through summer  
  
 loratadine 10 mg tablet Commonly known as:  Carmen Letona Take 10 mg by mouth.  
  
 metoprolol succinate 25 mg XL tablet Commonly known as:  TOPROL-XL Take 1 Tab by mouth daily. multivitamin tablet Commonly known as:  ONE A DAY Take 1 tablet by mouth daily. naproxen 500 mg tablet Commonly known as:  NAPROSYN Take 1 Tab by mouth two (2) times daily (with meals). Take BID for 3 days upon start of pain then PRN thereafter Prescriptions Printed Refills  
 amoxicillin-clavulanate (AUGMENTIN) 500-125 mg per tablet 0 Starting on: 5/25/2018 Sig: Take 1 Tab by mouth two (2) times a day for 7 days. Class: Print Route: Oral  
  
Follow-up Instructions Return if symptoms worsen or fail to improve, for Follow Up. Patient Instructions For your symptoms: Your symptoms may improve with an oral antihistamine. These are available over the counter and include: 
Loratadine/claritin Cetirizine/Zyrtec Fexofenadine/Allegra Levocetirizine/Xyzal 
 
· Your symptoms may improve with a nasal steroid. These are available over the counter and include: · Flonase (aka fluticasone) · Nasocort (aka triamcinolone) · Nasonex (aka mometasone) · Rhinocort (aka budesonide) · Increase fluid intake, especially water to thin mucous and boost the immune system. · Avoid sugar and dairy while congested since they thicken mucous. · Get plenty of rest!   
· Gargle 3 times daily and as needed in Listerine or warm salt water vinegar solutions (1 tsp salt, 1 tsp vinegar in 1 cup lukewarm water.) · Use OTC nasal saline spray up each nostril four times daily. You could also consider using a netipot with distilled water. · Use humidifier at bedtime. · Use OTC Mucinex 600 mg twice daily to loosen mucous. · Use OTC Tylenol (up to 650mg every 6 hours) as needed for pain, fever or headaches. ·  
Return to the doctor for evaluation: · If mucous is consistently discolored yellow or green throughout the day for more than a week · If you develop worsening facial pain · If you develop a fever that will not go away · If your symptoms worsen instead of improve Saline Nasal Washes: Care Instructions Your Care Instructions Saline nasal washes help keep the nasal passages open by washing out thick or dried mucus. This simple remedy can help relieve symptoms of allergies, sinusitis, and colds. It also can make the nose feel more comfortable by keeping the mucous membranes moist. You may notice a little burning sensation in your nose the first few times you use the solution, but this usually gets better in a few days. Follow-up care is a key part of your treatment and safety. Be sure to make and go to all appointments, and call your doctor if you are having problems. It's also a good idea to know your test results and keep a list of the medicines you take. How can you care for yourself at home? · You can buy premixed saline solution in a squeeze bottle or other sinus rinse products at a drugstore. Read and follow the instructions on the label. · You also can make your own saline solution by adding 1 teaspoon of salt and 1 teaspoon of baking soda to 2 cups of distilled water. · If you use a homemade solution, pour a small amount into a clean bowl. Using a rubber bulb syringe, squeeze the syringe and place the tip in the salt water. Pull a small amount of the salt water into the syringe by relaxing your hand. · Sit down with your head tilted slightly back. Do not lie down.  Put the tip of the bulb syringe or the squeeze bottle a little way into one of your nostrils. Gently drip or squirt a few drops into the nostril. Repeat with the other nostril. Some sneezing and gagging are normal at first. 
· Gently blow your nose. · Wipe the syringe or bottle tip clean after each use. · Repeat this 2 or 3 times a day. · Use nasal washes gently if you have nosebleeds often. When should you call for help? Watch closely for changes in your health, and be sure to contact your doctor if: 
? · You often get nosebleeds. ? · You have problems doing the nasal washes. Where can you learn more? Go to http://abel-pretty.info/. Enter [id number] [id number] [phone] in the search box to learn more about \"Saline Nasal Washes: Care Instructions. \" Current as of: May 12, 2017 Content Version: 11.4 © 3780-1944 [employer]. Care instructions adapted under license by [employer] (which disclaims liability or warranty for this information). If you have questions about a medical condition or this instruction, always ask your healthcare professional. [place] [address] any warranty or liability for your use of this information. Sinusitis: Care Instructions Your Care Instructions Sinusitis is an infection of the lining of the sinus cavities in your head. Sinusitis often follows a cold. It causes pain and pressure in your head and face. In most cases, sinusitis gets better on its own in 1 to 2 weeks. But some mild symptoms may last for several weeks. Sometimes antibiotics are needed. Follow-up care is a key part of your treatment and safety. Be sure to make and go to all appointments, and call your doctor if you are having problems. It's also a good idea to know your test results and keep a list of the medicines you take. How can you care for yourself at home?  
· Take an over-the-counter pain medicine, such as acetaminophen (Tylenol), ibuprofen (Advil, Motrin), or naproxen (Aleve). Read and follow all instructions on the label. · If the doctor prescribed antibiotics, take them as directed. Do not stop taking them just because you feel better. You need to take the full course of antibiotics. · Be careful when taking over-the-counter cold or flu medicines and Tylenol at the same time. Many of these medicines have acetaminophen, which is Tylenol. Read the labels to make sure that you are not taking more than the recommended dose. Too much acetaminophen (Tylenol) can be harmful. · Breathe warm, moist air from a steamy shower, a hot bath, or a sink filled with hot water. Avoid cold, dry air. Using a humidifier in your home may help. Follow the directions for cleaning the machine. · Use saline (saltwater) nasal washes to help keep your nasal passages open and wash out mucus and bacteria. You can buy saline nose drops at a grocery store or drugstore. Or you can make your own at home by adding 1 teaspoon of salt and 1 teaspoon of baking soda to 2 cups of distilled water. If you make your own, fill a bulb syringe with the solution, insert the tip into your nostril, and squeeze gently. Butch Irons your nose. · Put a hot, wet towel or a warm gel pack on your face 3 or 4 times a day for 5 to 10 minutes each time. · Try a decongestant nasal spray like oxymetazoline (Afrin). Do not use it for more than 3 days in a row. Using it for more than 3 days can make your congestion worse. When should you call for help? Call your doctor now or seek immediate medical care if: 
? · You have new or worse swelling or redness in your face or around your eyes. ? · You have a new or higher fever. ? Watch closely for changes in your health, and be sure to contact your doctor if: 
? · You have new or worse facial pain. ? · The mucus from your nose becomes thicker (like pus) or has new blood in it. ? · You are not getting better as expected. Where can you learn more? Go to http://abel-pretty.info/. Enter W542 in the search box to learn more about \"Sinusitis: Care Instructions. \" Current as of: May 12, 2017 Content Version: 11.4 © 9579-3569 [employer]. Care instructions adapted under license by [employer] (which disclaims liability or warranty for this information). If you have questions about a medical condition or this instruction, always ask your healthcare professional. [address] any warranty or liability for your use of this information. Introducing [facility]! Dear Vinetta Lesches: 
Thank you for requesting a Noveporter account. Our records indicate that you already have an active Noveporter account. You can access your account anytime at [email or web address] Did you know that you can access your hospital and ER discharge instructions at any time in Noveporter? You can also review all of your test results from your hospital stay or ER visit. Additional Information If you have questions, please visit the Frequently Asked Questions section of the Noveporter website at [email or web address] Remember, Noveporter is NOT to be used for urgent needs. For medical emergencies, dial 911. Now available from your iPhone and Android! Please provide this summary of care documentation to your next provider. Your primary care clinician is listed as Cleveland White. If you have any questions after today's visit, please call [phone].

## 2018-05-21 NOTE — PROGRESS NOTES
[facility] Note      Subjective:     Chief Complaint   Patient presents with   Jm Monroe since Saturday, with some chest congestion. Cough is productive with yellowish to green sputum. Esau Lao is a 47y.o. year old male who presents for evaluation of the following:    Sinus Congestion  Onset 2 days ago  Sx: sore throat, sinus congestion, runny nose, occasional cough with mucous  Tx: Dayquil, loratidine  Endorses history of allergic rhinits, body aches  Denies fever, chest pain, shortness of breath    Concerned about infection since he has a  baby at his house. Review of Systems   Pertinent positives and negative per HPI. All other systems  reviewed are negative for a Comprehensive ROS (10+). Past Medical History:   Diagnosis Date    Arrhythmia     Arthritis     Chronic pain     Contact dermatitis and other eczema, due to unspecified cause     GERD (gastroesophageal reflux disease)     Herniated disc, cervical     Hypercholesterolemia         Social History     Social History    Marital status:      Spouse name: N/A    Number of children: N/A    Years of education: N/A     Occupational History    Not on file. Social History Main Topics    Smoking status: Light Tobacco Smoker    Smokeless tobacco: Never Used      Comment: occasional cigar or cigarette    Alcohol use Yes      Comment: occasionally     Drug use: No    Sexual activity: Yes     Partners: Female     Birth control/ protection: None     Other Topics Concern    Not on file     Social History Narrative       Current Outpatient Prescriptions   Medication Sig    metoprolol succinate (TOPROL-XL) 25 mg XL tablet Take 1 Tab by mouth daily.  UBIDECARENONE (COQ-10 PO) Take 200 mg by mouth daily.  finasteride (PROPECIA) 1 mg tablet TAKE 1 TAB BY MOUTH DAILY.  loratadine (CLARITIN) 10 mg tablet Take 10 mg by mouth.     fluticasone (FLONASE) 50 mcg/actuation nasal spray 2 sprays each nostril BID for 3 days then 2 sprays each nostril daily through summer    clonazePAM (KLONOPIN) 0.5 mg tablet Take 1 Tab by mouth nightly as needed. Max Daily Amount: 0.5 mg.    naproxen (NAPROSYN) 500 mg tablet Take 1 Tab by mouth two (2) times daily (with meals). Take BID for 3 days upon start of pain then PRN thereafter    multivitamin (ONE A DAY) tablet Take 1 tablet by mouth daily. No current facility-administered medications for this visit. Objective:     Vitals:    05/21/18 1129   BP: 126/82   Pulse: (!) 55   Resp: 18   Temp: 98.8 °F (37.1 °C)   TempSrc: Oral   SpO2: 98%   Weight: 227 lb 3.2 oz (103.1 kg)   Height: 6' 0.5\" (1.842 m)       Physical Examination:  General: Alert, cooperative, no distress, appears stated age. Eyes: Conjunctivae clear. PERRL, EOMs intact. Ears: Normal external ear canals both ears. TM clear and mobile bilaterally  Nose: Nares normal. Septum midline. Mucosa normal. No drainage or sinus tenderness. Mouth/Throat: Lips, mucosa, and tongue normal. Teeth and gums normal. Mild oropharyngeal erythema. No tonsillar enlargement. Posterior pharyngeal exudate. Neck: Supple, symmetrical, trachea midline, no adenopathy. No thyroid enlargement/tenderness/nodules  Lungs: Clear to auscultation bilaterally. Normal inspiratory and expiratory ratio. Heart: Regular rate and rhythm, S1, S2 normal, no murmur, click, rub or gallop  Extremities: Extremities normal, atraumatic, no cyanosis or edema. Pulses: 2+ and symmetric all extremities. Skin: Skin color, texture, turgor normal. No rashes or lesions on exposed skin. Lymph nodes: Cervical, supraclavicular nodes normal.  Neurologic: CNII-XII intact.     Office Visit on 05/21/2018   Component Date Value Ref Range Status    VALID INTERNAL CONTROL POC 05/21/2018 Yes   Final    QuickVue Influenza test 05/21/2018 Negative  Negative Final           Assessment/ Plan:   Diagnoses and all orders for this visit:    1. Acute maxillary sinusitis, recurrence not specified  -     amoxicillin-clavulanate (AUGMENTIN) 500-125 mg per tablet; Take 1 Tab by mouth two (2) times a day for 7 days.  -     AMB POC RAPID INFLUENZA TEST    2. Seasonal allergic rhinitis due to pollen  -     fluticasone (FLONASE) 50 mcg/actuation nasal spray; 2 sprays each nostril BID for 3 days then 2 sprays each nostril daily through summer    Mild sinusitis , likely viral but cannot rule out bacterial etiology. benzonatate and otc meds for symptom relief discussed and listed in patient instructions- mucinex + antihistamine + sinus rinse + NSAID + humidifier prn. Flu test negative Delayed antibiotic Rx provided         I have discussed the diagnosis with the patient and the intended plan as seen in the above orders. The patient has received an after-visit summary and questions were answered concerning future plans. I have discussed medication side effects and warnings with the patient as well. Follow-up Disposition:  Return if symptoms worsen or fail to improve, for Follow Up.       Signed,    Henry Guerra MD  5/21/2018

## 2018-08-22 ENCOUNTER — OFFICE VISIT (OUTPATIENT)
Dept: FAMILY MEDICINE CLINIC | Age: 53
End: 2018-08-22

## 2018-08-22 VITALS
TEMPERATURE: 98.5 F | RESPIRATION RATE: 18 BRPM | WEIGHT: 215 LBS | HEIGHT: 73 IN | SYSTOLIC BLOOD PRESSURE: 109 MMHG | OXYGEN SATURATION: 98 % | DIASTOLIC BLOOD PRESSURE: 75 MMHG | BODY MASS INDEX: 28.49 KG/M2 | HEART RATE: 51 BPM

## 2018-08-22 DIAGNOSIS — K21.9 GASTROESOPHAGEAL REFLUX DISEASE WITHOUT ESOPHAGITIS: ICD-10-CM

## 2018-08-22 DIAGNOSIS — Z13.29 SCREENING FOR ENDOCRINE, METABOLIC AND IMMUNITY DISORDER: ICD-10-CM

## 2018-08-22 DIAGNOSIS — Z13.228 SCREENING FOR ENDOCRINE, METABOLIC AND IMMUNITY DISORDER: ICD-10-CM

## 2018-08-22 DIAGNOSIS — E78.5 HYPERLIPIDEMIA, UNSPECIFIED HYPERLIPIDEMIA TYPE: Primary | ICD-10-CM

## 2018-08-22 DIAGNOSIS — Z13.0 SCREENING FOR ENDOCRINE, METABOLIC AND IMMUNITY DISORDER: ICD-10-CM

## 2018-08-22 DIAGNOSIS — I47.1 PSVT (PAROXYSMAL SUPRAVENTRICULAR TACHYCARDIA) (HCC): ICD-10-CM

## 2018-08-22 RX ORDER — RANITIDINE 150 MG/1
150 TABLET, FILM COATED ORAL
Qty: 60 TAB | Refills: 5 | Status: SHIPPED | OUTPATIENT
Start: 2018-08-22

## 2018-08-22 NOTE — PROGRESS NOTES
[facility] Note    Joel Noe Sr. is a 47 y.o. male who was seen in clinic today (8/22/2018). Subjective:  Cardiovascular Review:  The patient has history of Atrial Fibrillation. Underwent ablation in 1991. Patient not currently seen by cardiology. Diet and Lifestyle: generally follows a low fat low cholesterol diet, generally follows a low sodium diet, sedentary  Home BP Monitoring: is not measured at home. Pertinent ROS: taking medications as instructed, no medication side effects noted, no TIA's, no chest pain on exertion, no dyspnea on exertion, no swelling of ankles. Colonoscopy in 2016 with repeat in 5 years. GERD  Patient complains of gastroesophageal reflux with abdominal bloating and belching. Symptoms have been present for a few months. He denies dysphagia. He  has not lost weight. He denies melena, hematochezia, hematemesis, and coffee ground emesis. He denies chest pain. Medical therapy in the past has included TUMs. Prior to Admission medications    Medication Sig Start Date End Date Taking? Authorizing Provider   raNITIdine (ZANTAC) 150 mg tablet Take 1 Tab by mouth two (2) times daily as needed for Indigestion. 8/22/18  Yes Crista Barnett NP   finasteride (PROPECIA) 1 mg tablet TAKE ONE TABLET BY MOUTH ONE TIME DAILY  6/26/18  Yes Crista Barnett NP   fluticasone (FLONASE) 50 mcg/actuation nasal spray 2 sprays each nostril BID for 3 days then 2 sprays each nostril daily through summer 5/21/18  Yes Earl Kat MD   loratadine (CLARITIN) 10 mg tablet Take 10 mg by mouth. Yes Historical Provider   clonazePAM (KLONOPIN) 0.5 mg tablet Take 1 Tab by mouth nightly as needed. Max Daily Amount: 0.5 mg. 10/15/15  Yes Kingston Pedersen, DO   multivitamin (ONE A DAY) tablet Take 1 tablet by mouth daily.    Yes Historical Provider          No Known Allergies        ROS  See HPI    Objective:   Physical Exam   Constitutional: He is oriented to person, place, and time. He appears well-developed and well-nourished. Neck: Normal range of motion. Neck supple. No JVD present. Carotid bruit is not present. No thyromegaly present. Cardiovascular: Normal rate, regular rhythm and intact distal pulses. Exam reveals no gallop and no friction rub. No murmur heard. Pulmonary/Chest: Effort normal and breath sounds normal. No respiratory distress. Musculoskeletal: He exhibits no edema. Lymphadenopathy:     He has no cervical adenopathy. Neurological: He is alert and oriented to person, place, and time. Psychiatric: He has a normal mood and affect. His behavior is normal.   Nursing note and vitals reviewed. Visit Vitals    /75 (BP 1 Location: Right arm, BP Patient Position: Sitting)    Pulse (!) 51    Temp 98.5 °F (36.9 °C)    Resp 18    Ht 6' 0.5\" (1.842 m)    Wt 215 lb (97.5 kg)    SpO2 98%    BMI 28.76 kg/m2       Assessment & Plan:  Diagnoses and all orders for this visit:    1. Hyperlipidemia, unspecified hyperlipidemia type  ACC/AHA 10 year CVD risk calculation: 3.4%. Reviewed diet and lifestyle changes.  -     METABOLIC PANEL, COMPREHENSIVE  -     LIPID PANEL    2. PSVT (paroxysmal supraventricular tachycardia) (HCC)  Stable, no changes to current therapy  -     CBC W/O DIFF    3. Screening for endocrine, metabolic and immunity disorder  -     CBC W/O DIFF  -     METABOLIC PANEL, COMPREHENSIVE  -     LIPID PANEL  -     PSA W/ REFLX FREE PSA    4. Gastroesophageal reflux disease without esophagitis  Reviewed diet and lifestyle changes. -     Begin raNITIdine (ZANTAC) 150 mg tablet; Take 1 Tab by mouth two (2) times daily as needed for Indigestion. I have discussed the diagnosis with the patient and the intended plan as seen in the above orders. The patient has received an after-visit summary along with patient information handout.   I have discussed medication side effects and warnings with the patient as well.    Follow-up Disposition:  Return in about 1 year (around 8/22/2019) for Annual Exam - 30 minutes.         Matt Savage NP

## 2018-08-22 NOTE — MR AVS SNAPSHOT
[address] 
[phone] Patient: Triny Packer Sr. MRN: [id number] :1965 Visit Information Date & Time Provider Department Dept. Phone Encounter #  
 2018  4:00 PM Arcelia Summers  [facility] [phone] [id number] Follow-up Instructions Return in about 1 year (around 2019) for Annual Exam - 30 minutes. Upcoming Health Maintenance Date Due Influenza Age 5 to Adult 3/31/2019* COLONOSCOPY 2021 DTaP/Tdap/Td series (2 - Td) 2027 *Topic was postponed. The date shown is not the original due date. Allergies as of 2018  Review Complete On: 2018 By: Flwoer Strauss LPN No Known Allergies Current Immunizations  Reviewed on 2018 Name Date Influenza Vaccine (Quad) PF 2018 Tdap 2017 Not reviewed this visit You Were Diagnosed With   
  
 Codes Comments Hyperlipidemia, unspecified hyperlipidemia type    -  Primary ICD-10-CM: E78.5 ICD-9-CM: 272.4 PSVT (paroxysmal supraventricular tachycardia) (HCC)     ICD-10-CM: I47.1 ICD-9-CM: 427.0 Screening for endocrine, metabolic and immunity disorder     ICD-10-CM: Z13.29, Z13.228, Z13.0 ICD-9-CM: V77.99 Gastroesophageal reflux disease without esophagitis     ICD-10-CM: K21.9 ICD-9-CM: 530.81 Vitals BP Pulse Temp Resp Height(growth percentile) Weight(growth percentile) 109/75 (BP 1 Location: Right arm, BP Patient Position: Sitting) (!) 51 98.5 °F (36.9 °C) 18 6' 0.5\" (1.842 m) 215 lb (97.5 kg) SpO2 BMI Smoking Status 98% 28.76 kg/m2 Light Tobacco Smoker Vitals History BMI and BSA Data Body Mass Index Body Surface Area 28.76 kg/m 2 2.23 m 2 Preferred Pharmacy Pharmacy Name [id number] - [facility], [address] [facility] [phone] Your Updated Medication List  
  
   
This list is accurate as of 8/22/18  4:21 PM.  Always use your most recent med list.  
  
  
  
  
 clonazePAM 0.5 mg tablet Commonly known as:  Lance Garridoamberly Take 1 Tab by mouth nightly as needed. Max Daily Amount: 0.5 mg.  
  
 finasteride 1 mg tablet Commonly known as:  Tannernimesh Mullins TAKE ONE TABLET BY MOUTH ONE TIME DAILY  
  
 fluticasone 50 mcg/actuation nasal spray Commonly known as:  FLONASE  
2 sprays each nostril BID for 3 days then 2 sprays each nostril daily through summer  
  
 loratadine 10 mg tablet Commonly known as:  Abigail Nova Take 10 mg by mouth.  
  
 multivitamin tablet Commonly known as:  ONE A DAY Take 1 tablet by mouth daily. raNITIdine 150 mg tablet Commonly known as:  ZANTAC Take 1 Tab by mouth two (2) times daily as needed for Indigestion. Prescriptions Sent to Pharmacy Refills  
 raNITIdine (ZANTAC) 150 mg tablet 5 Sig: Take 1 Tab by mouth two (2) times daily as needed for Indigestion. Class: Normal  
 Pharmacy: [address] #: [phone] Route: Oral  
  
We Performed the Following CBC W/O DIFF [99074 CPT(R)] LIPID PANEL [90560 CPT(R)] METABOLIC PANEL, COMPREHENSIVE [87671 CPT(R)] PSA W/ REFLX FREE PSA [23524 CPT(R)] Follow-up Instructions Return in about 1 year (around 8/22/2019) for Annual Exam - 30 minutes. Patient Instructions Gastroesophageal Reflux Disease (GERD): Care Instructions Your Care Instructions Gastroesophageal reflux disease (GERD) is the backward flow of stomach acid into the esophagus. The esophagus is the tube that leads from your throat to your stomach. A one-way valve prevents the stomach acid from moving up into this tube. When you have GERD, this valve does not close tightly enough.  
If you have mild GERD symptoms including heartburn, you may be able to control the problem with antacids or over-the-counter medicine. Changing your diet, losing weight, and making other lifestyle changes can also help reduce symptoms. Follow-up care is a key part of your treatment and safety. Be sure to make and go to all appointments, and call your doctor if you are having problems. It's also a good idea to know your test results and keep a list of the medicines you take. How can you care for yourself at home? · Take your medicines exactly as prescribed. Call your doctor if you think you are having a problem with your medicine. · Your doctor may recommend over-the-counter medicine. For mild or occasional indigestion, antacids, such as Tums, Gaviscon, Mylanta, or Maalox, may help. Your doctor also may recommend over-the-counter acid reducers, such as Pepcid AC, Tagamet HB, Zantac 75, or Prilosec. Read and follow all instructions on the label. If you use these medicines often, talk with your doctor. · Change your eating habits. ¨ It's best to eat several small meals instead of two or three large meals. ¨ After you eat, wait 2 to 3 hours before you lie down. ¨ Chocolate, mint, and alcohol can make GERD worse. ¨ Spicy foods, foods that have a lot of acid (like tomatoes and oranges), and coffee can make GERD symptoms worse in some people. If your symptoms are worse after you eat a certain food, you may want to stop eating that food to see if your symptoms get better. · Do not smoke or chew tobacco. Smoking can make GERD worse. If you need help quitting, talk to your doctor about stop-smoking programs and medicines. These can increase your chances of quitting for good. · If you have GERD symptoms at night, raise the head of your bed 6 to 8 inches by putting the frame on blocks or placing a foam wedge under the head of your mattress. (Adding extra pillows does not work.) · Do not wear tight clothing around your middle. · Lose weight if you need to. Losing just 5 to 10 pounds can help. When should you call for help? Call your doctor now or seek immediate medical care if: 
  · You have new or different belly pain.  
  · Your stools are black and tarlike or have streaks of blood.  
 Watch closely for changes in your health, and be sure to contact your doctor if: 
  · Your symptoms have not improved after 2 days.  
  · Food seems to catch in your throat or chest.  
Where can you learn more? Go to http://abel-pretty.info/. Enter D219 in the search box to learn more about \"Gastroesophageal Reflux Disease (GERD): Care Instructions. \" Current as of: May 12, 2017 Content Version: 11.7 © 4140-5828 [employer]. Care instructions adapted under license by [employer] (which disclaims liability or warranty for this information). If you have questions about a medical condition or this instruction, always ask your healthcare professional. [place] [address] any warranty or liability for your use of this information. Introducing \[facility]\"" & HEALTH SERVICES! Dear Flonnie Hodgkins: 
Thank you for requesting a InterEx account. Our records indicate that you already have an active InterEx account. You can access your account anytime at [email or web address] Did you know that you can access your hospital and ER discharge instructions at any time in InterEx? You can also review all of your test results from your hospital stay or ER visit. Additional Information If you have questions, please visit the Frequently Asked Questions section of the InterEx website at [email or web address] Remember, InterEx is NOT to be used for urgent needs. For medical emergencies, dial 911. Now available from your iPhone and Android! Please provide this summary of care documentation to your next provider. Your primary care clinician is listed as Emely Damon. If you have any questions after today's visit, please call [phone].

## 2018-08-22 NOTE — PATIENT INSTRUCTIONS

## 2018-08-22 NOTE — PROGRESS NOTES
Chief Complaint   Patient presents with    Medication Evaluation     follow up     1. Have you been to the ER, urgent care clinic since your last visit? Hospitalized since your last visit?no    2. Have you seen or consulted any other health care providers outside of the [facility] since your last visit? Include any pap smears or colon screening.  no

## 2018-08-23 LAB
ALBUMIN SERPL-MCNC: 4.7 G/DL (ref 3.5–5.5)
ALBUMIN/GLOB SERPL: 2 {RATIO} (ref 1.2–2.2)
ALP SERPL-CCNC: 56 IU/L (ref 39–117)
ALT SERPL-CCNC: 29 IU/L (ref 0–44)
AST SERPL-CCNC: 22 IU/L (ref 0–40)
BILIRUB SERPL-MCNC: 0.3 MG/DL (ref 0–1.2)
BUN SERPL-MCNC: 14 MG/DL (ref 6–24)
BUN/CREAT SERPL: 14 (ref 9–20)
CALCIUM SERPL-MCNC: 9.6 MG/DL (ref 8.7–10.2)
CHLORIDE SERPL-SCNC: 101 MMOL/L (ref 96–106)
CHOLEST SERPL-MCNC: 223 MG/DL (ref 100–199)
CO2 SERPL-SCNC: 26 MMOL/L (ref 20–29)
CREAT SERPL-MCNC: 1.02 MG/DL (ref 0.76–1.27)
ERYTHROCYTE [DISTWIDTH] IN BLOOD BY AUTOMATED COUNT: 13.6 % (ref 12.3–15.4)
GLOBULIN SER CALC-MCNC: 2.3 G/DL (ref 1.5–4.5)
GLUCOSE SERPL-MCNC: 83 MG/DL (ref 65–99)
HCT VFR BLD AUTO: 41.6 % (ref 37.5–51)
HDLC SERPL-MCNC: 52 MG/DL
HGB BLD-MCNC: 14.1 G/DL (ref 13–17.7)
INTERPRETATION, 910389: NORMAL
LDLC SERPL CALC-MCNC: 129 MG/DL (ref 0–99)
MCH RBC QN AUTO: 29.2 PG (ref 26.6–33)
MCHC RBC AUTO-ENTMCNC: 33.9 G/DL (ref 31.5–35.7)
MCV RBC AUTO: 86 FL (ref 79–97)
PLATELET # BLD AUTO: 279 X10E3/UL (ref 150–379)
POTASSIUM SERPL-SCNC: 4.4 MMOL/L (ref 3.5–5.2)
PROT SERPL-MCNC: 7 G/DL (ref 6–8.5)
PSA SERPL-MCNC: 0.2 NG/ML (ref 0–4)
RBC # BLD AUTO: 4.83 X10E6/UL (ref 4.14–5.8)
REFLEX CRITERIA: NORMAL
SODIUM SERPL-SCNC: 142 MMOL/L (ref 134–144)
TRIGL SERPL-MCNC: 208 MG/DL (ref 0–149)
VLDLC SERPL CALC-MCNC: 42 MG/DL (ref 5–40)
WBC # BLD AUTO: 6.8 X10E3/UL (ref 3.4–10.8)

## 2018-10-03 ENCOUNTER — OFFICE VISIT (OUTPATIENT)
Dept: FAMILY MEDICINE CLINIC | Age: 53
End: 2018-10-03

## 2018-10-03 VITALS
BODY MASS INDEX: 28.63 KG/M2 | DIASTOLIC BLOOD PRESSURE: 75 MMHG | WEIGHT: 216 LBS | OXYGEN SATURATION: 99 % | SYSTOLIC BLOOD PRESSURE: 133 MMHG | HEART RATE: 55 BPM | TEMPERATURE: 98.3 F | RESPIRATION RATE: 20 BRPM | HEIGHT: 73 IN

## 2018-10-03 DIAGNOSIS — K21.9 GASTROESOPHAGEAL REFLUX DISEASE WITHOUT ESOPHAGITIS: ICD-10-CM

## 2018-10-03 DIAGNOSIS — F51.01 PRIMARY INSOMNIA: ICD-10-CM

## 2018-10-03 DIAGNOSIS — K14.8 LESION OF TONGUE: Primary | ICD-10-CM

## 2018-10-03 RX ORDER — CLONAZEPAM 0.5 MG/1
0.5 TABLET ORAL
Qty: 30 TAB | Refills: 0 | Status: SHIPPED | OUTPATIENT
Start: 2018-10-03 | End: 2021-10-20 | Stop reason: SDUPTHER

## 2018-10-03 RX ORDER — PANTOPRAZOLE SODIUM 20 MG/1
20 TABLET, DELAYED RELEASE ORAL DAILY
Qty: 30 TAB | Refills: 5 | Status: SHIPPED | OUTPATIENT
Start: 2018-10-03

## 2018-10-03 NOTE — MR AVS SNAPSHOT
Kade Mi 
 
 
 [address] [facility] [address] 
[phone] Patient: Rodney Neves Sr. MRN: [id number] :1965 Visit Information Date & Time Provider Department Dept. Phone Encounter #  
 10/3/2018  1:30 PM Saba Armijo  [facility] [phone] [id number] Follow-up Instructions Return if symptoms worsen or fail to improve. Upcoming Health Maintenance Date Due Shingrix Vaccine Age 50> (1 of 2) 2016 Influenza Age 5 to Adult 10/3/2018* COLONOSCOPY 2021 DTaP/Tdap/Td series (2 - Td) 2027 *Topic was postponed. The date shown is not the original due date. Allergies as of 10/3/2018  Review Complete On: 10/3/2018 By: Chante Morrison LPN No Known Allergies Current Immunizations  Reviewed on 2018 Name Date Influenza Vaccine (Quad) PF 2018 Tdap 2017 Not reviewed this visit You Were Diagnosed With   
  
 Codes Comments Lesion of tongue    -  Primary ICD-10-CM: K14.8 ICD-9-CM: 529.8 Vitals BP Pulse Temp Resp Height(growth percentile) Weight(growth percentile) 133/75 (BP 1 Location: Right arm, BP Patient Position: Sitting) (!) 55 98.3 °F (36.8 °C) (Oral) 20 6' 0.5\" (1.842 m) 216 lb (98 kg) SpO2 BMI Smoking Status 99% 28.89 kg/m2 Light Tobacco Smoker Vitals History BMI and BSA Data Body Mass Index Body Surface Area  
 28.89 kg/m 2 2.24 m 2 Preferred Pharmacy Pharmacy Name Phone [id number] - [facility], [address] [facility] [phone] Your Updated Medication List  
  
   
This list is accurate as of 10/3/18  1:54 PM.  Always use your most recent med list.  
  
  
  
  
 clonazePAM 0.5 mg tablet Commonly known as:  Jim Heart Take 1 Tab by mouth nightly as needed. Max Daily Amount: 0.5 mg.  
  
 finasteride 1 mg tablet Commonly known as:  Junito Resendiz  
 TAKE ONE TABLET BY MOUTH ONE TIME DAILY  
  
 fluticasone 50 mcg/actuation nasal spray Commonly known as:  FLONASE  
2 sprays each nostril BID for 3 days then 2 sprays each nostril daily through summer  
  
 loratadine 10 mg tablet Commonly known as:  Thomas Elvira Take 10 mg by mouth.  
  
 multivitamin tablet Commonly known as:  ONE A DAY Take 1 tablet by mouth daily. raNITIdine 150 mg tablet Commonly known as:  ZANTAC Take 1 Tab by mouth two (2) times daily as needed for Indigestion. We Performed the Following REFERRAL TO ORAL MAXILLOFACIAL SURGERY [REF59 Custom] Follow-up Instructions Return if symptoms worsen or fail to improve. Referral Information Referral ID Referred By Referred To  
  
 [id number] [facility] [address] [place] [address], [address] [place] [address] Visits Status Start Date End Date 1 New Request 10/3/18 10/3/19 If your referral has a status of pending review or denied, additional information will be sent to support the outcome of this decision. Introducing [facility] & HEALTH SERVICES! Dear Rayshawn Strauss: 
Thank you for requesting a Poll Everywhere account. Our records indicate that you already have an active Poll Everywhere account. You can access your account anytime at [email or web address] Did you know that you can access your hospital and ER discharge instructions at any time in Poll Everywhere? You can also review all of your test results from your hospital stay or ER visit. Additional Information If you have questions, please visit the Frequently Asked Questions section of the Poll Everywhere website at [email or web address] Remember, Poll Everywhere is NOT to be used for urgent needs. For medical emergencies, dial 911. Now available from your iPhone and Android! Please provide this summary of care documentation to your next provider. Your primary care clinician is listed as Janine Gonzalez. If you have any questions after today's visit, please call [phone].

## 2018-10-03 NOTE — PROGRESS NOTES
[facility] Note    Yasemin Sofia Sr. is a 47 y.o. male who was seen in clinic today (10/3/2018). Subjective:  Skin Lesion   Patient complains of a right side of tongue skin lesion. The patient reports that the lesion has been present for 2 days. The onset of the mass was sudden. Associated symptoms include pain, enlargement. Patient denies redness or discoloration of the skin, bleeding. Prior treatments, if any: none. Patient reports 20 pack year smoking history. GERD  Patient complains of gastroesophageal reflux with abdominal bloating and belching. Symptoms have been present for a few months. He denies dysphagia. He  has not lost weight. He denies melena, hematochezia, hematemesis, and coffee ground emesis. He denies chest pain. Medical therapy in the past has included TUMs, ranitidine. Prior to Admission medications    Medication Sig Start Date End Date Taking? Authorizing Provider   pantoprazole (PROTONIX) 20 mg tablet Take 1 Tab by mouth daily. 10/3/18  Yes Lincoln Ozuna NP   clonazePAM (KLONOPIN) 0.5 mg tablet Take 1 Tab by mouth nightly as needed. Max Daily Amount: 0.5 mg. 10/3/18  Yes Lincoln Ozuna NP   raNITIdine (ZANTAC) 150 mg tablet Take 1 Tab by mouth two (2) times daily as needed for Indigestion. 8/22/18  Yes Lincoln Ozuna NP   finasteride (PROPECIA) 1 mg tablet TAKE ONE TABLET BY MOUTH ONE TIME DAILY  6/26/18  Yes Lincoln Ozuna NP   fluticasone (FLONASE) 50 mcg/actuation nasal spray 2 sprays each nostril BID for 3 days then 2 sprays each nostril daily through summer 5/21/18  Yes Toya Graham MD   loratadine (CLARITIN) 10 mg tablet Take 10 mg by mouth. Yes Historical Provider   multivitamin (ONE A DAY) tablet Take 1 tablet by mouth daily. Yes Historical Provider        No Known Allergies        ROS  See HPI    Objective:   Physical Exam   Constitutional: He is oriented to person, place, and time.  He appears well-developed and well-nourished. No distress. HENT:   Right Ear: Tympanic membrane and ear canal normal.   Left Ear: Tympanic membrane and ear canal normal.   Nose: Mucosal edema present. Right sinus exhibits no maxillary sinus tenderness and no frontal sinus tenderness. Left sinus exhibits no maxillary sinus tenderness and no frontal sinus tenderness. Mouth/Throat: Oropharynx is clear and moist.       Cardiovascular: Normal rate, regular rhythm and normal heart sounds. No murmur heard. Pulmonary/Chest: Effort normal and breath sounds normal. He has no decreased breath sounds. He has no wheezes. He has no rhonchi. Lymphadenopathy:     He has no cervical adenopathy. Neurological: He is alert and oriented to person, place, and time. Psychiatric: He has a normal mood and affect. His behavior is normal.   Nursing note and vitals reviewed. Visit Vitals    /75 (BP 1 Location: Right arm, BP Patient Position: Sitting)    Pulse (!) 55    Temp 98.3 °F (36.8 °C) (Oral)    Resp 20    Ht 6' 0.5\" (1.842 m)    Wt 216 lb (98 kg)    SpO2 99%    BMI 28.89 kg/m2       Assessment & Plan:  Diagnoses and all orders for this visit:    1. Lesion of tongue  Has characteristics of a cyst. Request surgical evaluation.   -     REFERRAL TO ORAL MAXILLOFACIAL SURGERY    2. Gastroesophageal reflux disease without esophagitis  Not controlled with H2 blocker. Begin PPI. -     pantoprazole (PROTONIX) 20 mg tablet; Take 1 Tab by mouth daily. 3. Primary insomnia  Last refill 10/2015  -     clonazePAM (KLONOPIN) 0.5 mg tablet; Take 1 Tab by mouth nightly as needed. Max Daily Amount: 0.5 mg.      I have discussed the diagnosis with the patient and the intended plan as seen in the above orders. The patient has received an after-visit summary along with patient information handout. I have discussed medication side effects and warnings with the patient as well.     Follow-up Disposition:  Return if symptoms worsen or fail to improve.         Kianna Duncan, NP

## 2018-10-03 NOTE — PROGRESS NOTES
Chief Complaint   Patient presents with    Tongue Swelling     patient states small lump on side of tongue- first noticed yesterday- 1/10 pain    Medication Evaluation     1. Have you been to the ER, urgent care clinic since your last visit? Hospitalized since your last visit? No    2. Have you seen or consulted any other health care providers outside of the [address] [place] [address] since your last visit? Include any pap smears or colon screening.  No

## 2018-12-03 ENCOUNTER — CLINICAL SUPPORT (OUTPATIENT)
Dept: FAMILY MEDICINE CLINIC | Age: 53
End: 2018-12-03

## 2018-12-03 DIAGNOSIS — Z23 ENCOUNTER FOR IMMUNIZATION: Primary | ICD-10-CM

## 2019-01-23 ENCOUNTER — OFFICE VISIT (OUTPATIENT)
Dept: FAMILY MEDICINE CLINIC | Age: 54
End: 2019-01-23

## 2019-01-23 VITALS
SYSTOLIC BLOOD PRESSURE: 137 MMHG | DIASTOLIC BLOOD PRESSURE: 80 MMHG | BODY MASS INDEX: 29.37 KG/M2 | OXYGEN SATURATION: 99 % | RESPIRATION RATE: 18 BRPM | HEART RATE: 50 BPM | TEMPERATURE: 98.1 F | HEIGHT: 73 IN | WEIGHT: 221.6 LBS

## 2019-01-23 DIAGNOSIS — K21.9 GASTROESOPHAGEAL REFLUX DISEASE WITHOUT ESOPHAGITIS: Primary | ICD-10-CM

## 2019-01-23 DIAGNOSIS — R10.31 RIGHT LOWER QUADRANT ABDOMINAL PAIN: ICD-10-CM

## 2019-01-23 DIAGNOSIS — R53.83 FATIGUE, UNSPECIFIED TYPE: ICD-10-CM

## 2019-01-23 NOTE — PROGRESS NOTES
[address] [facility] Note    Charline Hampton Sr. is a 47 y.o. male who was seen in clinic today (1/23/2019). Subjective:  GERD  Patient complains of gastroesophageal reflux with abdominal bloating and belching. Symptoms have been present for a few months. He denies dysphagia. He  has not lost weight. He denies melena, hematochezia, hematemesis, and coffee ground emesis. He denies chest pain. Medical therapy in the past has included TUMs, ranitidine and Pantoprazole. Had colonoscopy in 8/2016 with polyp removed. Repeat 5 years. Abdominal Pain  Patient complains of abdominal pain. The pain is described as dull and aching, and is 3/10 in intensity. Pain is located in the RLQ with radiation to R groin. Onset was 6 months ago. Symptoms have been unchanged since. Aggravating factors: ejaculation. Alleviating factors: none. Associated symptoms: belching and constipation. The patient denies fever. Fatigue  Patient complains of fatigue. Symptoms began several months ago. Symptoms of his fatigue have been general malaise, decreased libido. Patient describes the following psychologic symptoms: stress at work: mild, stress in the family: mild. Patient denies fever, significant change in weight, unusual rashes. The course has been symptoms have progressed to a point and plateaued. . Severity has been symptoms bothersome, but easily able to carry out all usual work/school/family. Prior to Admission medications    Medication Sig Start Date End Date Taking? Authorizing Provider   finasteride (PROPECIA) 1 mg tablet TAKE 1 TABLET BY MOUTH ONE TIME DAILY 10/18/18  Yes Kodak Bangura NP   clonazePAM (KLONOPIN) 0.5 mg tablet Take 1 Tab by mouth nightly as needed. Max Daily Amount: 0.5 mg. 10/3/18  Yes Kodak Bangura NP   loratadine (CLARITIN) 10 mg tablet Take 10 mg by mouth. Yes Provider, Historical   multivitamin (ONE A DAY) tablet Take 1 tablet by mouth daily.    Yes Provider, Historical   pantoprazole (PROTONIX) 20 mg tablet Take 1 Tab by mouth daily. 10/3/18   Cherylene Shutter, NP   raNITIdine (ZANTAC) 150 mg tablet Take 1 Tab by mouth two (2) times daily as needed for Indigestion. 8/22/18   Cherylene Shutter, NP   fluticasone (FLONASE) 50 mcg/actuation nasal spray 2 sprays each nostril BID for 3 days then 2 sprays each nostril daily through summer 5/21/18   Claudia Nunes MD          No Known Allergies        ROS  See HPI    Objective:   Physical Exam   Constitutional: He is oriented to person, place, and time. He appears well-developed and well-nourished. Cardiovascular: Normal rate, regular rhythm and intact distal pulses. Exam reveals no gallop and no friction rub. No murmur heard. Pulmonary/Chest: Effort normal and breath sounds normal. No respiratory distress. Abdominal: Normal appearance and bowel sounds are normal. He exhibits no distension. There is no tenderness. There is no rigidity and no guarding. Neurological: He is alert and oriented to person, place, and time. Psychiatric: He has a normal mood and affect. His speech is normal and behavior is normal. Thought content normal.   Nursing note and vitals reviewed. Visit Vitals  /80 (BP 1 Location: Left arm, BP Patient Position: Sitting)   Pulse (!) 50   Temp 98.1 °F (36.7 °C) (Oral)   Resp 18   Ht 6' 1\" (1.854 m)   Wt 221 lb 9.6 oz (100.5 kg)   SpO2 99%   BMI 29.24 kg/m²       Assessment & Plan:  Diagnoses and all orders for this visit:    1. Gastroesophageal reflux disease without esophagitis  Recommended GI evaluation and EGD given long h/o GERD. Reviewed diet and lifestyle changes.  -     REFERRAL TO GASTROENTEROLOGY    2. Right lower quadrant abdominal pain  Non-reproducible on exam. Rule out diverticular disease vs hernia.   -     REFERRAL TO GASTROENTEROLOGY  -     CT ABD PELV W CONT; Future    3.  Fatigue, unspecified type  Rule out endocrine etiology.   -     TSH AND FREE T4  - VITAMIN D, 25 HYDROXY  -     TESTOSTERONE, FREE+TOTAL      I have discussed the diagnosis with the patient and the intended plan as seen in the above orders. The patient has received an after-visit summary along with patient information handout. I have discussed medication side effects and warnings with the patient as well. Follow-up Disposition:  Return if symptoms worsen or fail to improve.         Tyshawn Adams NP

## 2019-01-23 NOTE — PROGRESS NOTES
Chief Complaint   Patient presents with    Abdominal Pain     right lower abd. pain x 6 months, Denies any n/v, diarrhea. Has constipation issues. No urinary sx's. Reviewed Record in preparation for visit and have obtained necessary documentation. Identified pt with two pt identifiers (Name @ )    Health Maintenance Due   Topic    Shingrix Vaccine Age 50> (1 of 2)         1. Have you been to the ER, urgent care clinic since your last visit? Hospitalized since your last visit? no    2. Have you seen or consulted any other health care providers outside of the [address] [place] [address] since your last visit? Include any pap smears or colon screening.  no

## 2019-01-23 NOTE — PATIENT INSTRUCTIONS

## 2019-01-31 ENCOUNTER — HOSPITAL ENCOUNTER (OUTPATIENT)
Dept: CT IMAGING | Age: 54
Discharge: HOME | End: 2019-01-31
Payer: COMMERCIAL

## 2019-01-31 DIAGNOSIS — R10.31 RIGHT LOWER QUADRANT ABDOMINAL PAIN: ICD-10-CM

## 2019-01-31 PROCEDURE — 74177 CT ABD & PELVIS W/CONTRAST: CPT

## 2019-01-31 PROCEDURE — 74011636320 HC RX REV CODE- 636/320: Performed by: RADIOLOGY

## 2019-01-31 PROCEDURE — 74011000258 HC RX REV CODE- 258: Performed by: RADIOLOGY

## 2019-01-31 RX ORDER — SODIUM CHLORIDE 0.9 % (FLUSH) 0.9 %
10 SYRINGE (ML) INJECTION
Status: COMPLETED | OUTPATIENT
Start: 2019-01-31 | End: 2019-01-31

## 2019-01-31 RX ADMIN — IOPAMIDOL 100 ML: 755 INJECTION, SOLUTION INTRAVENOUS at 10:25

## 2019-01-31 RX ADMIN — SODIUM CHLORIDE 100 ML: 900 INJECTION, SOLUTION INTRAVENOUS at 10:25

## 2019-01-31 RX ADMIN — IOHEXOL 50 ML: 240 INJECTION, SOLUTION INTRATHECAL; INTRAVASCULAR; INTRAVENOUS; ORAL at 10:25

## 2019-01-31 RX ADMIN — Medication 10 ML: at 10:25

## 2019-01-31 NOTE — PROGRESS NOTES
CT scan was normal. No cause of his abdominal pain seen. There was a small hiatal hernia (were the stomach comes through the diaphragm). No treatment other then control of acid reflux symptoms.

## 2019-02-08 LAB
25(OH)D3+25(OH)D2 SERPL-MCNC: 28.7 NG/ML (ref 30–100)
T4 FREE SERPL-MCNC: 1.08 NG/DL (ref 0.82–1.77)
TESTOST FREE SERPL-MCNC: 11.8 PG/ML (ref 7.2–24)
TESTOST SERPL-MCNC: 467.9 NG/DL (ref 264–916)
TSH SERPL DL<=0.005 MIU/L-ACNC: 2.64 UIU/ML (ref 0.45–4.5)

## 2020-02-12 RX ORDER — FINASTERIDE 1 MG/1
TABLET, FILM COATED ORAL
Qty: 90 TAB | Refills: 0 | Status: SHIPPED | OUTPATIENT
Start: 2020-02-12 | End: 2020-06-12

## 2020-02-14 ENCOUNTER — OFFICE VISIT (OUTPATIENT)
Dept: FAMILY MEDICINE CLINIC | Age: 55
End: 2020-02-14

## 2020-02-14 VITALS
TEMPERATURE: 98.8 F | DIASTOLIC BLOOD PRESSURE: 79 MMHG | RESPIRATION RATE: 18 BRPM | BODY MASS INDEX: 28.89 KG/M2 | HEIGHT: 73 IN | OXYGEN SATURATION: 98 % | WEIGHT: 218 LBS | HEART RATE: 64 BPM | SYSTOLIC BLOOD PRESSURE: 125 MMHG

## 2020-02-14 DIAGNOSIS — R05.9 COUGH: ICD-10-CM

## 2020-02-14 DIAGNOSIS — J01.00 ACUTE NON-RECURRENT MAXILLARY SINUSITIS: Primary | ICD-10-CM

## 2020-02-14 RX ORDER — BENZONATATE 200 MG/1
200 CAPSULE ORAL
Qty: 30 CAP | Refills: 0 | Status: SHIPPED | OUTPATIENT
Start: 2020-02-14 | End: 2020-02-24

## 2020-02-14 RX ORDER — AMOXICILLIN 875 MG/1
875 TABLET, FILM COATED ORAL 2 TIMES DAILY
Qty: 20 TAB | Refills: 0 | Status: SHIPPED | OUTPATIENT
Start: 2020-02-14 | End: 2020-02-24

## 2020-02-14 NOTE — PROGRESS NOTES
HISTORY OF PRESENT ILLNESS  Juan Francisco Brown Sr. is a 49 y.o. male. HPI: Patient is complaining of nasal congestion, yellow/green nasal discharge, headache and cough x 9 days. Taking Mucinex, Claritin, naproxen and sudafed without help. Denies chills, fever and wheezing. Past Medical History:   Diagnosis Date    Arrhythmia     Arthritis     Chronic pain     Contact dermatitis and other eczema, due to unspecified cause     GERD (gastroesophageal reflux disease)     Herniated disc, cervical     Hypercholesterolemia      Past Surgical History:   Procedure Laterality Date    CARDIAC SURG PROCEDURE UNLIST      HX AFIB ABLATION      HX ORTHOPAEDIC      pins in R hand     HX TONSILLECTOMY     No Known Allergies    Current Outpatient Medications:     amoxicillin (AMOXIL) 875 mg tablet, Take 1 Tab by mouth two (2) times a day for 10 days. , Disp: 20 Tab, Rfl: 0    benzonatate (TESSALON) 200 mg capsule, Take 1 Cap by mouth three (3) times daily as needed for Cough for up to 10 days. , Disp: 30 Cap, Rfl: 0    finasteride (PROPECIA) 1 mg tablet, TAKE 1 TABLET BY MOUTH ONE TIME DAILY, Disp: 90 Tab, Rfl: 0    clonazePAM (KLONOPIN) 0.5 mg tablet, Take 1 Tab by mouth nightly as needed. Max Daily Amount: 0.5 mg., Disp: 30 Tab, Rfl: 0    fluticasone (FLONASE) 50 mcg/actuation nasal spray, 2 sprays each nostril BID for 3 days then 2 sprays each nostril daily through summer, Disp: 1 Bottle, Rfl: 2    loratadine (CLARITIN) 10 mg tablet, Take 10 mg by mouth., Disp: , Rfl:     multivitamin (ONE A DAY) tablet, Take 1 tablet by mouth daily. , Disp: , Rfl:     pantoprazole (PROTONIX) 20 mg tablet, Take 1 Tab by mouth daily. , Disp: 30 Tab, Rfl: 5    raNITIdine (ZANTAC) 150 mg tablet, Take 1 Tab by mouth two (2) times daily as needed for Indigestion. , Disp: 60 Tab, Rfl: 5  Review of Systems   Constitutional: Negative. HENT: Positive for congestion and sinus pain. Respiratory: Positive for cough and sputum production. Cardiovascular: Negative. Gastrointestinal: Negative. Blood pressure 125/79, pulse 64, temperature 98.8 °F (37.1 °C), temperature source Oral, resp. rate 18, height 6' 1\" (1.854 m), weight 218 lb (98.9 kg), SpO2 98 %. Body mass index is 28.76 kg/m². Physical Exam  Constitutional:       Appearance: Normal appearance. HENT:      Right Ear: Tympanic membrane normal.      Left Ear: Tympanic membrane normal.      Nose: Congestion and rhinorrhea present. Mouth/Throat:      Pharynx: Posterior oropharyngeal erythema present. No oropharyngeal exudate. Neck:      Musculoskeletal: Normal range of motion and neck supple. Cardiovascular:      Rate and Rhythm: Normal rate and regular rhythm. Pulses: Normal pulses. Heart sounds: Normal heart sounds. No murmur. Pulmonary:      Effort: Pulmonary effort is normal. No respiratory distress. Breath sounds: No stridor. Rhonchi present. No wheezing or rales. Chest:      Chest wall: No tenderness. Abdominal:      General: Bowel sounds are normal.      Palpations: Abdomen is soft. Neurological:      Mental Status: He is alert. ASSESSMENT and PLAN    ICD-10-CM ICD-9-CM    1. Acute non-recurrent maxillary sinusitis J01.00 461.0 amoxicillin (AMOXIL) 875 mg tablet   2.  Cough R05 786.2 benzonatate (TESSALON) 200 mg capsule   Advised to stop sudafed and mucinex  Pt was given an after visit summary which includes diagnosis, current medicines and vital and voiced understanding of treatment plan

## 2020-02-14 NOTE — PROGRESS NOTES
Chief Complaint   Patient presents with    Sinus Infection     Patient is in the office today with complaints congestion, drainage, headache and chest congestion. 1. Have you been to the ER, urgent care clinic since your last visit? Hospitalized since your last visit? No    2. Have you seen or consulted any other health care providers outside of the [address] [place] [address] since your last visit? Include any pap smears or colon screening.  No

## 2020-02-26 ENCOUNTER — OFFICE VISIT (OUTPATIENT)
Dept: FAMILY MEDICINE CLINIC | Age: 55
End: 2020-02-26

## 2020-02-26 VITALS
TEMPERATURE: 98.6 F | HEIGHT: 73 IN | DIASTOLIC BLOOD PRESSURE: 70 MMHG | HEART RATE: 52 BPM | SYSTOLIC BLOOD PRESSURE: 131 MMHG | RESPIRATION RATE: 16 BRPM | OXYGEN SATURATION: 98 % | BODY MASS INDEX: 29.69 KG/M2 | WEIGHT: 224 LBS

## 2020-02-26 DIAGNOSIS — J01.01 ACUTE RECURRENT MAXILLARY SINUSITIS: Primary | ICD-10-CM

## 2020-02-26 RX ORDER — CEFDINIR 300 MG/1
300 CAPSULE ORAL 2 TIMES DAILY
Qty: 20 CAP | Refills: 0 | Status: SHIPPED | OUTPATIENT
Start: 2020-02-26 | End: 2020-03-07

## 2020-02-26 NOTE — PROGRESS NOTES
Identified pt with two pt identifiers(name and ). Reviewed record in preparation for visit and have obtained necessary documentation. Chief Complaint   Patient presents with    Sinus Infection     treated for sinus infection approx 1.5wks ago- finished 10day course of abx ths past  cough and fatigue still present; pt denies fever/chills        Health Maintenance Due   Topic    Pneumococcal 0-64 years (1 of 1 - PPSV23)    Shingrix Vaccine Age 50> (1 of 2)       Coordination of Care Questionnaire:  :   1) Have you been to an emergency room, urgent care, or hospitalized since your last visit? If yes, where when, and reason for visit? no       2. Have seen or consulted any other health care provider since your last visit? If yes, where when, and reason for visit? NO    Patient is accompanied by self I have received verbal consent from Cornel Grubbs. to discuss any/all medical information while they are present in the room.

## 2020-02-26 NOTE — PATIENT INSTRUCTIONS
Sinusitis: Care Instructions  Your Care Instructions    Sinusitis is an infection of the lining of the sinus cavities in your head. Sinusitis often follows a cold. It causes pain and pressure in your head and face. In most cases, sinusitis gets better on its own in 1 to 2 weeks. But some mild symptoms may last for several weeks. Sometimes antibiotics are needed. Follow-up care is a key part of your treatment and safety. Be sure to make and go to all appointments, and call your doctor if you are having problems. It's also a good idea to know your test results and keep a list of the medicines you take. How can you care for yourself at home? · Take an over-the-counter pain medicine, such as acetaminophen (Tylenol), ibuprofen (Advil, Motrin), or naproxen (Aleve). Read and follow all instructions on the label. · If the doctor prescribed antibiotics, take them as directed. Do not stop taking them just because you feel better. You need to take the full course of antibiotics. · Be careful when taking over-the-counter cold or flu medicines and Tylenol at the same time. Many of these medicines have acetaminophen, which is Tylenol. Read the labels to make sure that you are not taking more than the recommended dose. Too much acetaminophen (Tylenol) can be harmful. · Breathe warm, moist air from a steamy shower, a hot bath, or a sink filled with hot water. Avoid cold, dry air. Using a humidifier in your home may help. Follow the directions for cleaning the machine. · Use saline (saltwater) nasal washes to help keep your nasal passages open and wash out mucus and bacteria. You can buy saline nose drops at a grocery store or drugstore. Or you can make your own at home by adding 1 teaspoon of salt and 1 teaspoon of baking soda to 2 cups of distilled water. If you make your own, fill a bulb syringe with the solution, insert the tip into your nostril, and squeeze gently. Nury Usman your nose.   · Put a hot, wet towel or a warm gel pack on your face 3 or 4 times a day for 5 to 10 minutes each time. · Try a decongestant nasal spray like oxymetazoline (Afrin). Do not use it for more than 3 days in a row. Using it for more than 3 days can make your congestion worse. When should you call for help? Call your doctor now or seek immediate medical care if:    · You have new or worse swelling or redness in your face or around your eyes.     · You have a new or higher fever.    Watch closely for changes in your health, and be sure to contact your doctor if:    · You have new or worse facial pain.     · The mucus from your nose becomes thicker (like pus) or has new blood in it.     · You are not getting better as expected. Where can you learn more? Go to http://abel-pretty.info/. Enter S618 in the search box to learn more about \"Sinusitis: Care Instructions. \"  Current as of: October 21, 2018  Content Version: 12.2  © 7125-4817 [employer]. Care instructions adapted under license by [employer] (which disclaims liability or warranty for this information). If you have questions about a medical condition or this instruction, always ask your healthcare professional. [address] any warranty or liability for your use of this information.

## 2020-02-26 NOTE — PROGRESS NOTES
[facility] Note    Dolly Valerio Sr. is a 49 y.o. male who was seen in clinic today (2/27/2020). Subjective:  Upper Respiratory Infection  Patient complains of symptoms of a URI, possible sinusitis. Symptoms include congestion and cough. Onset of symptoms was 2 weeks ago, gradually worsening since that time. He also c/o achiness, congestion, cough described as productive of clear sputum and post nasal drip for the past 2 weeks . He is drinking plenty of fluids. . Evaluation to date: none. Treatment to date: OTC products. Prior to Admission medications    Medication Sig Start Date End Date Taking? Authorizing Provider   cefdinir (OMNICEF) 300 mg capsule Take 1 Cap by mouth two (2) times a day for 10 days. 2/26/20 3/7/20 Yes Juan Manuel Bangura NP   finasteride (PROPECIA) 1 mg tablet TAKE 1 TABLET BY MOUTH ONE TIME DAILY 2/12/20  Yes Becka Kumari NP   fluticasone (FLONASE) 50 mcg/actuation nasal spray 2 sprays each nostril BID for 3 days then 2 sprays each nostril daily through summer 5/21/18  Yes Nguyễn Vidal MD   loratadine (CLARITIN) 10 mg tablet Take 10 mg by mouth daily. Yes Provider, Historical   multivitamin (ONE A DAY) tablet Take 1 tablet by mouth daily. Yes Provider, Historical   pantoprazole (PROTONIX) 20 mg tablet Take 1 Tab by mouth daily. Patient not taking: Reported on 2/26/2020 10/3/18   Becka Kumari NP   clonazePAM (KLONOPIN) 0.5 mg tablet Take 1 Tab by mouth nightly as needed. Max Daily Amount: 0.5 mg. 10/3/18   Becka Kumari NP   raNITIdine (ZANTAC) 150 mg tablet Take 1 Tab by mouth two (2) times daily as needed for Indigestion. Patient not taking: Reported on 2/26/2020 8/22/18   Becka Kumari NP          No Known Allergies        ROS  See HPI    Objective:   Physical Exam  Vitals signs and nursing note reviewed. Constitutional:       General: He is not in acute distress. Appearance: He is well-developed. HENT:      Right Ear: Tympanic membrane and ear canal normal.      Left Ear: Tympanic membrane and ear canal normal.      Nose: Mucosal edema present. Right Sinus: Maxillary sinus tenderness present. No frontal sinus tenderness. Left Sinus: Maxillary sinus tenderness present. No frontal sinus tenderness. Cardiovascular:      Rate and Rhythm: Normal rate and regular rhythm. Heart sounds: Normal heart sounds. No murmur. Pulmonary:      Effort: Pulmonary effort is normal.      Breath sounds: Normal breath sounds. No decreased breath sounds, wheezing or rhonchi. Lymphadenopathy:      Cervical: No cervical adenopathy. Neurological:      Mental Status: He is alert and oriented to person, place, and time. Psychiatric:         Behavior: Behavior normal.           Visit Vitals  /70   Pulse (!) 52   Temp 98.6 °F (37 °C) (Oral)   Resp 16   Ht 6' 1\" (1.854 m)   Wt 224 lb (101.6 kg)   SpO2 98%   BMI 29.55 kg/m²       Assessment & Plan:  Diagnoses and all orders for this visit:    1. Acute recurrent maxillary sinusitis  Normal saline nasal rinse daily. Flonase nasal spray daily  -     Begin cefdinir (OMNICEF) 300 mg capsule; Take 1 Cap by mouth two (2) times a day for 10 days. I have discussed the diagnosis with the patient and the intended plan as seen in the above orders. The patient has received an after-visit summary along with patient information handout. I have discussed medication side effects and warnings with the patient as well. Follow-up and Dispositions    · Return if symptoms worsen or fail to improve.            Noe Humphries NP

## 2020-10-16 ENCOUNTER — TELEPHONE (OUTPATIENT)
Dept: FAMILY MEDICINE CLINIC | Age: 55
End: 2020-10-16

## 2020-10-16 NOTE — TELEPHONE ENCOUNTER
----- Message from Stormy Mauricio sent at 10/16/2020  8:20 AM EDT -----  Regarding: Jorge Luis Bangura/telephone  Contact: [phone]  Appointment not available    Caller's first and last name and relationship to patient (if not the patient):      Best contact [phone]      Preferred date and time:      Scheduled appointment date and time:      Reason for appointment:Flu shot      Details to clarify the request:      Stormy Mauricio

## 2020-10-16 NOTE — TELEPHONE ENCOUNTER
Call placed to patient name and  verified. Advised patient we are currently not scheduling flu vaccine visits. He was appreciative of call and expressed understanding.

## 2020-11-04 NOTE — TELEPHONE ENCOUNTER
Health Maintenance Due   Topic Date Due   â¢ DTaP/Tdap/Td Vaccine (1 - Tdap) 08/03/1971   â¢ Shingles Vaccine (1 of 2) 08/03/2002   â¢ Pneumococcal Vaccine 65+ (1 of 1 - PPSV23) 08/03/2017   â¢ Breast Cancer Screening  11/21/2019   â¢ Diabetes Eye Exam  06/01/2020   â¢ Influenza Vaccine (1) 09/01/2020   â¢ Diabetes Foot Exam  01/20/2021       Patient is due for topics as listed above but is not proceeding with Immunization(s) Influenza and all other topics at this time. LOV 6/20/2017

## 2021-01-31 RX ORDER — FINASTERIDE 1 MG/1
TABLET, FILM COATED ORAL
Qty: 90 TAB | Refills: 0 | Status: SHIPPED | OUTPATIENT
Start: 2021-01-31 | End: 2021-05-29

## 2021-05-29 RX ORDER — FINASTERIDE 1 MG/1
TABLET, FILM COATED ORAL
Qty: 90 TABLET | Refills: 0 | Status: SHIPPED | OUTPATIENT
Start: 2021-05-29 | End: 2021-10-01 | Stop reason: SDUPTHER

## 2021-09-27 NOTE — TELEPHONE ENCOUNTER
Last Visit: 2/26/20 NP Ashok Valerio  Next Appointment: Not scheduled- Needs new provider/appt! Previous Refill Encounter(s): 5/29/21 90    Requested Prescriptions     Pending Prescriptions Disp Refills    finasteride (PROPECIA) 1 mg tablet 30 Tablet 0     Sig: Take 1 Tablet by mouth daily. Appointment due for further refills with new provider!

## 2021-10-01 RX ORDER — FINASTERIDE 1 MG/1
1 TABLET, FILM COATED ORAL DAILY
Qty: 30 TABLET | Refills: 0 | Status: SHIPPED | OUTPATIENT
Start: 2021-10-01 | End: 2021-10-20 | Stop reason: SDUPTHER

## 2021-10-01 NOTE — TELEPHONE ENCOUNTER
Patient called to check status of finasteride 1 mg tablet. Patient has appointment with CHRISTIANO Paredes on 10/20/21 wants to know if he could enough till his appointment.     Best call back #[phone]

## 2021-10-01 NOTE — TELEPHONE ENCOUNTER
CHRISTIANO Paredes,    Patient call for refill of finasteride. Noted last OV was 2/14/2020 CHRISTIANO Bangura. Tried contacting patient to advise he needed appointment and left message to call the office to schedule with provider. Ton Paredes listed PCP as of 8/11/21. Thanks, Eliceo Lira    Last Visit: 2/14/20 CHRISTIANO Bangura   Next Appointment: Not scheduled- appt due  Previous Refill Encounter(s): 5/29/21 90    Requested Prescriptions     Pending Prescriptions Disp Refills    finasteride (PROPECIA) 1 mg tablet 30 Tablet 0     Sig: Take 1 Tablet by mouth daily. Appointment due for further refills!

## 2021-10-07 NOTE — TELEPHONE ENCOUNTER
PCP: Reggie Avina NP    Last appt: Visit date not found  Future Appointments   Date Time Provider Soraya Anguiano   10/20/2021 10:00 AM Anand Paredes NP [facility]       Requested Prescriptions     Pending Prescriptions Disp Refills    finasteride (PROPECIA) 1 mg tablet 30 Tablet 0     Sig: Take 1 Tablet by mouth daily. Appointment due for further refills with new provider!          Prior labs and Blood pressures:  BP Readings from Last 3 Encounters:   02/26/20 131/70   02/14/20 125/79   01/23/19 137/80     Lab Results   Component Value Date/Time    Sodium 142 08/22/2018 04:24 PM    Potassium 4.4 08/22/2018 04:24 PM    Chloride 101 08/22/2018 04:24 PM    CO2 26 08/22/2018 04:24 PM    Glucose 83 08/22/2018 04:24 PM    BUN 14 08/22/2018 04:24 PM    Creatinine 1.02 08/22/2018 04:24 PM    BUN/Creatinine ratio 14 08/22/2018 04:24 PM    GFR est AA 97 08/22/2018 04:24 PM    GFR est non-AA 84 08/22/2018 04:24 PM    Calcium 9.6 08/22/2018 04:24 PM     No results found for: HBA1C, KQI2FWHG, UYB4SAOW  Lab Results   Component Value Date/Time    Cholesterol, total 223 (H) 08/22/2018 04:24 PM    HDL Cholesterol 52 08/22/2018 04:24 PM    LDL, calculated 129 (H) 08/22/2018 04:24 PM    VLDL, calculated 42 (H) 08/22/2018 04:24 PM    Triglyceride 208 (H) 08/22/2018 04:24 PM     Lab Results   Component Value Date/Time    VITAMIN D, 25-HYDROXY 28.7 (L) 01/23/2019 10:38 AM       Lab Results   Component Value Date/Time    TSH 2.640 01/23/2019 10:38 AM

## 2021-10-11 RX ORDER — FINASTERIDE 1 MG/1
1 TABLET, FILM COATED ORAL DAILY
Qty: 30 TABLET | Refills: 0 | OUTPATIENT
Start: 2021-10-11

## 2021-10-20 ENCOUNTER — OFFICE VISIT (OUTPATIENT)
Dept: FAMILY MEDICINE CLINIC | Age: 56
End: 2021-10-20
Payer: COMMERCIAL

## 2021-10-20 VITALS
DIASTOLIC BLOOD PRESSURE: 78 MMHG | RESPIRATION RATE: 16 BRPM | BODY MASS INDEX: 29.57 KG/M2 | HEIGHT: 73 IN | TEMPERATURE: 97.7 F | HEART RATE: 42 BPM | WEIGHT: 223.1 LBS | SYSTOLIC BLOOD PRESSURE: 138 MMHG | OXYGEN SATURATION: 97 %

## 2021-10-20 DIAGNOSIS — F51.01 PRIMARY INSOMNIA: ICD-10-CM

## 2021-10-20 DIAGNOSIS — R00.1 BRADYCARDIA: ICD-10-CM

## 2021-10-20 DIAGNOSIS — R53.83 FATIGUE, UNSPECIFIED TYPE: ICD-10-CM

## 2021-10-20 DIAGNOSIS — Z12.5 SCREENING PSA (PROSTATE SPECIFIC ANTIGEN): ICD-10-CM

## 2021-10-20 DIAGNOSIS — Z00.00 GENERAL MEDICAL EXAMINATION: Primary | ICD-10-CM

## 2021-10-20 DIAGNOSIS — E55.9 VITAMIN D DEFICIENCY: ICD-10-CM

## 2021-10-20 DIAGNOSIS — J06.9 URI, ACUTE: ICD-10-CM

## 2021-10-20 DIAGNOSIS — Z12.11 ENCOUNTER FOR SCREENING COLONOSCOPY: ICD-10-CM

## 2021-10-20 LAB
25(OH)D3 SERPL-MCNC: 23.7 NG/ML (ref 30–100)
ALBUMIN SERPL-MCNC: 4.3 G/DL (ref 3.5–5)
ALBUMIN/GLOB SERPL: 1.4 {RATIO} (ref 1.1–2.2)
ALP SERPL-CCNC: 62 U/L (ref 45–117)
ALT SERPL-CCNC: 34 U/L (ref 12–78)
ANION GAP SERPL CALC-SCNC: 5 MMOL/L (ref 5–15)
AST SERPL-CCNC: 18 U/L (ref 15–37)
BILIRUB SERPL-MCNC: 0.5 MG/DL (ref 0.2–1)
BUN SERPL-MCNC: 16 MG/DL (ref 6–20)
BUN/CREAT SERPL: 18 (ref 12–20)
CALCIUM SERPL-MCNC: 10.1 MG/DL (ref 8.5–10.1)
CHLORIDE SERPL-SCNC: 104 MMOL/L (ref 97–108)
CHOLEST SERPL-MCNC: 231 MG/DL
CO2 SERPL-SCNC: 28 MMOL/L (ref 21–32)
CREAT SERPL-MCNC: 0.9 MG/DL (ref 0.7–1.3)
ERYTHROCYTE [DISTWIDTH] IN BLOOD BY AUTOMATED COUNT: 13 % (ref 11.5–14.5)
GLOBULIN SER CALC-MCNC: 3.1 G/DL (ref 2–4)
GLUCOSE SERPL-MCNC: 90 MG/DL (ref 65–100)
HCT VFR BLD AUTO: 48.2 % (ref 36.6–50.3)
HDLC SERPL-MCNC: 57 MG/DL
HDLC SERPL: 4.1 {RATIO} (ref 0–5)
HGB BLD-MCNC: 15 G/DL (ref 12.1–17)
LDLC SERPL CALC-MCNC: 148.8 MG/DL (ref 0–100)
MCH RBC QN AUTO: 29.1 PG (ref 26–34)
MCHC RBC AUTO-ENTMCNC: 31.1 G/DL (ref 30–36.5)
MCV RBC AUTO: 93.4 FL (ref 80–99)
NRBC # BLD: 0 K/UL (ref 0–0.01)
NRBC BLD-RTO: 0 PER 100 WBC
PLATELET # BLD AUTO: 277 K/UL (ref 150–400)
PMV BLD AUTO: 11.2 FL (ref 8.9–12.9)
POTASSIUM SERPL-SCNC: 4.9 MMOL/L (ref 3.5–5.1)
PROT SERPL-MCNC: 7.4 G/DL (ref 6.4–8.2)
RBC # BLD AUTO: 5.16 M/UL (ref 4.1–5.7)
SODIUM SERPL-SCNC: 137 MMOL/L (ref 136–145)
T4 FREE SERPL-MCNC: 1 NG/DL (ref 0.8–1.5)
TRIGL SERPL-MCNC: 126 MG/DL (ref ?–150)
TSH SERPL DL<=0.05 MIU/L-ACNC: 2.12 UIU/ML (ref 0.36–3.74)
VLDLC SERPL CALC-MCNC: 25.2 MG/DL
WBC # BLD AUTO: 6.5 K/UL (ref 4.1–11.1)

## 2021-10-20 PROCEDURE — 99396 PREV VISIT EST AGE 40-64: CPT | Performed by: NURSE PRACTITIONER

## 2021-10-20 RX ORDER — FINASTERIDE 1 MG/1
1 TABLET, FILM COATED ORAL DAILY
Qty: 90 TABLET | Refills: 1 | Status: SHIPPED | OUTPATIENT
Start: 2021-10-20 | End: 2022-06-08

## 2021-10-20 RX ORDER — AZITHROMYCIN 250 MG/1
TABLET, FILM COATED ORAL
Qty: 6 TABLET | Refills: 0 | Status: SHIPPED | OUTPATIENT
Start: 2021-10-20 | End: 2021-10-25

## 2021-10-20 RX ORDER — CLONAZEPAM 0.5 MG/1
0.5 TABLET ORAL
Qty: 30 TABLET | Refills: 0 | Status: SHIPPED | OUTPATIENT
Start: 2021-10-20

## 2021-10-20 NOTE — PROGRESS NOTES
Subjective:     Deng Thompson is a 55 y.o. male presenting for annual exam and complete physical.    Patient Active Problem List   Diagnosis Code    Hair thinning L65.9    Hyperlipidemia E78.5    Allergic rhinitis J30.9    History of anxiety disorder Z86.59    Insomnia G47.00    Colon polyp K63.5    Serrated adenoma of colon D12.6    Palpitations R00.2    PSVT (paroxysmal supraventricular tachycardia) ([facility]) I47.1     Patient Active Problem List    Diagnosis Date Noted    PSVT (paroxysmal supraventricular tachycardia) ([facility]) 07/25/2017    Palpitations 07/21/2017    Serrated adenoma of colon 08/23/2016    Colon polyp 08/10/2016    Hair thinning 12/04/2014    Hyperlipidemia 12/04/2014    Allergic rhinitis 12/04/2014    History of anxiety disorder 12/04/2014    Insomnia 12/04/2014     Current Outpatient Medications   Medication Sig Dispense Refill    clonazePAM (KlonoPIN) 0.5 mg tablet Take 1 Tablet by mouth nightly as needed (anxiety). Max Daily Amount: 0.5 mg. 30 Tablet 0    azithromycin (ZITHROMAX) 250 mg tablet Take 2 tablets today, then take 1 tablet daily 6 Tablet 0    finasteride (PROPECIA) 1 mg tablet Take 1 Tablet by mouth daily. Appointment due for further refills! 90 Tablet 1    fluticasone (FLONASE) 50 mcg/actuation nasal spray 2 sprays each nostril BID for 3 days then 2 sprays each nostril daily through summer 1 Bottle 2    loratadine (CLARITIN) 10 mg tablet Take 10 mg by mouth daily.  multivitamin (ONE A DAY) tablet Take 1 tablet by mouth daily.  pantoprazole (PROTONIX) 20 mg tablet Take 1 Tab by mouth daily. (Patient not taking: Reported on 2/26/2020) 30 Tab 5    raNITIdine (ZANTAC) 150 mg tablet Take 1 Tab by mouth two (2) times daily as needed for Indigestion.  (Patient not taking: Reported on 2/26/2020) 60 Tab 5     No Known Allergies  Past Medical History:   Diagnosis Date    Arrhythmia     Arthritis     Chronic pain     Contact dermatitis and other eczema, due to unspecified cause     GERD (gastroesophageal reflux disease)     Herniated disc, cervical     Hypercholesterolemia      Past Surgical History:   Procedure Laterality Date    HX AFIB ABLATION      HX ORTHOPAEDIC      pins in R hand     HX TONSILLECTOMY      VT CARDIAC SURG PROCEDURE UNLIST       Family History   Problem Relation Age of Onset    Arthritis-osteo Mother     Heart Disease Father     Cancer Father         bladder     Heart Disease Paternal Grandfather      Social History     Tobacco Use    Smoking status: Light Tobacco Smoker    Smokeless tobacco: Never Used    Tobacco comment: occasional cigar or cigarette   Substance Use Topics    Alcohol use: Yes     Comment: occasionally         C/O nasal congestion green nasal discharge and scratchy throat for five days. C/O fatigue, has had low testosterone in the past    Has low heart rate, is followed by cardiologist, he hasn't seen cardiologist in two years. Review of Systems  A comprehensive review of systems was negative except for that written in the HPI.     Objective:     Visit Vitals  /78   Pulse (!) 42   Temp 97.7 °F (36.5 °C) (Temporal)   Resp 16   Ht 6' 1\" (1.854 m)   Wt 223 lb 1.6 oz (101.2 kg)   SpO2 97%   BMI 29.43 kg/m²     Physical exam:   General appearance - alert, well appearing, and in no distress  Mental status - alert, oriented to person, place, and time  Eyes - pupils equal and reactive, extraocular eye movements intact  Ears - bilateral TM's and external ear canals normal  Nose - naris boggy and erythematous, with yellow/green discharge   Mouth - mucous membranes moist, pharynx normal without lesions  Neck - supple, no significant adenopathy  Lymphatics - no palpable lymphadenopathy, no hepatosplenomegaly  Chest - clear to auscultation, no wheezes, rales or rhonchi, symmetric air entry  Heart -  Sinus ranjeet cardia, normal S1, S2, no murmurs, rubs, clicks or gallops  Abdomen - soft, nontender, nondistended, no masses or organomegaly   Male - no penile lesions or discharge, no testicular masses or tenderness, no hernias  Rectal - negative without mass, lesions or tenderness  Back exam - full range of motion, no tenderness, palpable spasm or pain on motion  Neurological - alert, oriented, normal speech, no focal findings or movement disorder noted  Musculoskeletal - no joint tenderness, deformity or swelling  Extremities - peripheral pulses normal, no pedal edema, no clubbing or cyanosis  Skin - normal coloration and turgor, no rashes, no suspicious skin lesions noted     Assessment/Plan:       continue present plan, routine labs ordered. ICD-10-CM ICD-9-CM    1. General medical examination  Z00.00 V70.9 LIPID PANEL      METABOLIC PANEL, COMPREHENSIVE      PSA W/ REFLX FREE PSA      T4, FREE      TSH 3RD GENERATION      CBC W/O DIFF   2. Primary insomnia  F51.01 307.42 clonazePAM (KlonoPIN) 0.5 mg tablet   3. Vitamin D deficiency  E55.9 268.9 VITAMIN D, 25 HYDROXY   4. Screening PSA (prostate specific antigen)  Z12.5 V76.44    5. Fatigue, unspecified type  R53.83 780.79 TESTOSTERONE, FREE & TOTAL   6. URI, acute  J06.9 465.9 azithromycin (ZITHROMAX) 250 mg tablet   7. Encounter for screening colonoscopy  Z12.11 V76.51 REFERRAL TO GASTROENTEROLOGY   8. Bradycardia  R00.1 427.89 REFERRAL TO CARDIOLOGY   .

## 2021-10-20 NOTE — PROGRESS NOTES
No chief complaint on file. 1. \"Have you been to the ER, urgent care clinic since your last visit? Hospitalized since your last visit? \" [facility] - [facility], stitches on head, hit head on metal bracket     2. \"Have you seen or consulted any other health care providers outside of the [address] [place] [address] since your last visit? \" No     3. For patients over 45: Has the patient had a colonoscopy? No     If the patient is female:    4. For patients over 40: Has the patient had a mammogram? n/a  5. For patients over 21: Has the patient had a pap smear?  N/a     3 most recent PHQ Screens 10/20/2021   Little interest or pleasure in doing things Several days   Feeling down, depressed, irritable, or hopeless Several days   Total Score PHQ 2 2

## 2021-10-22 LAB
PSA SERPL-MCNC: 0.1 NG/ML (ref 0–4)
REFLEX CRITERIA: NORMAL

## 2021-10-24 LAB
TESTOST FREE SERPL-MCNC: 11.7 PG/ML (ref 7.2–24)
TESTOST SERPL-MCNC: 485 NG/DL (ref 264–916)

## 2022-01-13 ENCOUNTER — TRANSCRIBE ORDER (OUTPATIENT)
Dept: SCHEDULING | Age: 57
End: 2022-01-13

## 2022-01-13 DIAGNOSIS — Z00.00 ROUTINE GENERAL MEDICAL EXAMINATION AT A HEALTH CARE FACILITY: Primary | ICD-10-CM

## 2022-01-14 ENCOUNTER — HOSPITAL ENCOUNTER (OUTPATIENT)
Dept: CT IMAGING | Age: 57
Discharge: HOME | End: 2022-01-14
Payer: SELF-PAY

## 2022-01-14 DIAGNOSIS — Z00.00 ROUTINE GENERAL MEDICAL EXAMINATION AT A HEALTH CARE FACILITY: ICD-10-CM

## 2022-01-14 PROCEDURE — 75571 CT HRT W/O DYE W/CA TEST: CPT

## 2022-03-18 PROBLEM — I47.1 PSVT (PAROXYSMAL SUPRAVENTRICULAR TACHYCARDIA) (HCC): Status: ACTIVE | Noted: 2017-07-25

## 2022-03-18 PROBLEM — R00.2 PALPITATIONS: Status: ACTIVE | Noted: 2017-07-21

## 2022-03-18 PROBLEM — I47.10 PSVT (PAROXYSMAL SUPRAVENTRICULAR TACHYCARDIA): Status: ACTIVE | Noted: 2017-07-25

## 2022-06-08 RX ORDER — FINASTERIDE 1 MG/1
TABLET, FILM COATED ORAL
Qty: 90 TABLET | Refills: 0 | Status: SHIPPED | OUTPATIENT
Start: 2022-06-08 | End: 2022-09-07

## 2022-09-07 RX ORDER — FINASTERIDE 1 MG/1
TABLET, FILM COATED ORAL
Qty: 90 TABLET | Refills: 0 | Status: SHIPPED | OUTPATIENT
Start: 2022-09-07

## 2022-12-11 RX ORDER — FINASTERIDE 1 MG/1
TABLET, FILM COATED ORAL
Qty: 90 TABLET | Refills: 0 | Status: SHIPPED | OUTPATIENT
Start: 2022-12-11

## 2023-01-17 ENCOUNTER — OFFICE VISIT (OUTPATIENT)
Dept: FAMILY MEDICINE CLINIC | Age: 58
End: 2023-01-17
Payer: COMMERCIAL

## 2023-01-17 VITALS
HEART RATE: 64 BPM | RESPIRATION RATE: 16 BRPM | DIASTOLIC BLOOD PRESSURE: 72 MMHG | TEMPERATURE: 98.2 F | OXYGEN SATURATION: 97 % | BODY MASS INDEX: 30.18 KG/M2 | WEIGHT: 222.8 LBS | SYSTOLIC BLOOD PRESSURE: 123 MMHG | HEIGHT: 72 IN

## 2023-01-17 DIAGNOSIS — Z00.00 ADULT GENERAL MEDICAL EXAM: Primary | ICD-10-CM

## 2023-01-17 DIAGNOSIS — E55.9 VITAMIN D DEFICIENCY: ICD-10-CM

## 2023-01-17 DIAGNOSIS — K59.09 OTHER CONSTIPATION: ICD-10-CM

## 2023-01-17 DIAGNOSIS — Z12.5 SCREENING PSA (PROSTATE SPECIFIC ANTIGEN): ICD-10-CM

## 2023-01-17 DIAGNOSIS — K21.00 GASTROESOPHAGEAL REFLUX DISEASE WITH ESOPHAGITIS WITHOUT HEMORRHAGE: ICD-10-CM

## 2023-01-17 DIAGNOSIS — Z11.59 ENCOUNTER FOR HEPATITIS C SCREENING TEST FOR LOW RISK PATIENT: ICD-10-CM

## 2023-01-17 PROCEDURE — 99396 PREV VISIT EST AGE 40-64: CPT | Performed by: NURSE PRACTITIONER

## 2023-01-17 RX ORDER — LANOLIN ALCOHOL/MO/W.PET/CERES
400 CREAM (GRAM) TOPICAL DAILY
Qty: 30 TABLET | Refills: 2 | Status: SHIPPED | OUTPATIENT
Start: 2023-01-17

## 2023-01-17 RX ORDER — CETIRIZINE HYDROCHLORIDE 10 MG/1
10 TABLET ORAL
COMMUNITY

## 2023-01-17 RX ORDER — OMEPRAZOLE 20 MG/1
20 CAPSULE, DELAYED RELEASE ORAL DAILY
Qty: 30 CAPSULE | Refills: 0 | Status: SHIPPED | OUTPATIENT
Start: 2023-01-17

## 2023-01-17 NOTE — PROGRESS NOTES
Subjective:     Lisa Berry is a 65 y.o. male presenting for annual exam and complete physical.  1) C/O constipation, take Miralax daily  2) C/O GERD    Patient Active Problem List   Diagnosis Code    Hair thinning L65.9    Hyperlipidemia E78.5    Allergic rhinitis J30.9    History of anxiety disorder Z86.59    Insomnia G47.00    Colon polyp K63.5    Serrated adenoma of colon D12.6    Palpitations R00.2    PSVT (paroxysmal supraventricular tachycardia) ([facility]) I47.1     Patient Active Problem List    Diagnosis Date Noted    PSVT (paroxysmal supraventricular tachycardia) ([facility] [address] 07/25/2017    Palpitations 07/21/2017    Serrated adenoma of colon 08/23/2016    Colon polyp 08/10/2016    Hair thinning 12/04/2014    Hyperlipidemia 12/04/2014    Allergic rhinitis 12/04/2014    History of anxiety disorder 12/04/2014    Insomnia 12/04/2014     Current Outpatient Medications   Medication Sig Dispense Refill    Multivit-Iron-FA with Ca&Mins 10 mg iron-400 mcg tab 1 Tablet. cetirizine (ZYRTEC) 10 mg tablet 10 mg.      magnesium oxide (MAG-OX) 400 mg tablet Take 1 Tablet by mouth daily. 30 Tablet 2    omeprazole (PRILOSEC) 20 mg capsule Take 1 Capsule by mouth daily. 30 Capsule 0    finasteride (PROPECIA) 1 mg tablet Take 1 tablet by mouth once daily. Must make appt with doctor. 90 Tablet 0    clonazePAM (KlonoPIN) 0.5 mg tablet Take 1 Tablet by mouth nightly as needed (anxiety). Max Daily Amount: 0.5 mg. 30 Tablet 0    fluticasone (FLONASE) 50 mcg/actuation nasal spray 2 sprays each nostril BID for 3 days then 2 sprays each nostril daily through summer 1 Bottle 2    loratadine (CLARITIN) 10 mg tablet Take 10 mg by mouth daily. multivitamin (ONE A DAY) tablet Take 1 tablet by mouth daily.        No Known Allergies  Past Medical History:   Diagnosis Date    Arrhythmia     Arthritis     Chronic pain     Contact dermatitis and other eczema, due to unspecified cause     GERD (gastroesophageal reflux disease) Herniated disc, cervical     Hypercholesterolemia      Past Surgical History:   Procedure Laterality Date    HX AFIB ABLATION      HX ORTHOPAEDIC      pins in R hand     HX TONSILLECTOMY      NE UNLISTED PROCEDURE CARDIAC SURGERY       Family History   Problem Relation Age of Onset    OSTEOARTHRITIS Mother     Heart Disease Father     Cancer Father         bladder     Heart Disease Paternal Grandfather      Social History     Tobacco Use    Smoking status: Light Smoker    Smokeless tobacco: Never    Tobacco comments:     occasional cigar or cigarette   Substance Use Topics    Alcohol use: Yes     Comment: occasionally         Review of Systems  A comprehensive review of systems was negative except for that written in the HPI.     Objective:     Visit Vitals  /72 (BP 1 Location: Left arm, BP Patient Position: Sitting, BP Cuff Size: Large adult)   Pulse 64   Temp 98.2 °F (36.8 °C) (Temporal)   Resp 16   Ht 6' (1.829 m)   Wt 222 lb 12.8 oz (101.1 kg)   SpO2 97%   BMI 30.22 kg/m²     Physical exam:   General appearance - alert, well appearing, and in no distress  Mental status - alert, oriented to person, place, and time  Eyes - pupils equal and reactive, extraocular eye movements intact  Ears - bilateral TM's and external ear canals normal  Nose - normal and patent, no erythema, discharge or polyps  Mouth - mucous membranes moist, pharynx normal without lesions  Neck - supple, no significant adenopathy  Lymphatics - no palpable lymphadenopathy, no hepatosplenomegaly  Chest - clear to auscultation, no wheezes, rales or rhonchi, symmetric air entry  Heart - normal rate, regular rhythm, normal S1, S2, no murmurs, rubs, clicks or gallops  Abdomen - soft, nontender, nondistended, no masses or organomegaly   Male - no penile lesions or discharge, no testicular masses or tenderness, no hernias  Back exam - full range of motion, no tenderness, palpable spasm or pain on motion  Neurological - alert, oriented, normal speech, no focal findings or movement disorder noted  Musculoskeletal - no joint tenderness, deformity or swelling  Extremities - peripheral pulses normal, no pedal edema, no clubbing or cyanosis  Skin - normal coloration and turgor, no rashes, no suspicious skin lesions noted     Assessment/Plan:       follow low fat diet, follow low salt diet, continue present plan, routine labs ordered. Diagnoses and all orders for this visit:    1. Adult general medical exam  -     LIPID PANEL; Future  -     METABOLIC PANEL, COMPREHENSIVE; Future  -     CBC W/O DIFF; Future  -     TSH 3RD GENERATION; Future    2. Vitamin D deficiency  -     VITAMIN D, 25 HYDROXY; Future    3. Screening PSA (prostate specific antigen)  -     PSA SCREENING (SCREENING); Future    4. Encounter for hepatitis C screening test for low risk patient  -     HEPATITIS C AB; Future    5. Other constipation  -     magnesium oxide (MAG-OX) 400 mg tablet; Take 1 Tablet by mouth daily. 6. Gastroesophageal reflux disease with esophagitis without hemorrhage  -     omeprazole (PRILOSEC) 20 mg capsule; Take 1 Capsule by mouth daily.

## 2023-01-17 NOTE — PROGRESS NOTES
Chief Complaint   Patient presents with    Physical         1. \"Have you been to the ER, urgent care clinic since your last visit? Hospitalized since your last visit? \" No    2. \"Have you seen or consulted any other health care providers outside of the [address] [place] [address] since your last visit? \" Yes [facility] 12/2022      3. For patients aged 39-70: Has the patient had a colonoscopy / FIT/ Cologuard? Yes - no Care Gap present      If the patient is female:    4. For patients aged 41-77: Has the patient had a mammogram within the past 2 years? NA - based on age or sex      11. For patients aged 21-65: Has the patient had a pap smear?  NA - based on age or sex         3 most recent PHQ Screens 1/17/2023   Little interest or pleasure in doing things Not at all   Feeling down, depressed, irritable, or hopeless Not at all   Total Score PHQ 2 0   Trouble falling or staying asleep, or sleeping too much Not at all   Feeling tired or having little energy Not at all   Poor appetite, weight loss, or overeating Not at all   Feeling bad about yourself - or that you are a failure or have let yourself or your family down Not at all   Trouble concentrating on things such as school, work, reading, or watching TV Not at all   Moving or speaking so slowly that other people could have noticed; or the opposite being so fidgety that others notice Not at all   Thoughts of being better off dead, or hurting yourself in some way Not at all   PHQ 9 Score 0   How difficult have these problems made it for you to do your work, take care of your home and get along with others Not difficult at all       Health Maintenance Due   Topic Date Due    Hepatitis C Screening  Never done    COVID-19 Vaccine (1) Never done    Pneumococcal 0-64 years (1 - PCV) Never done    Shingles Vaccine (1 of 2) Never done    Flu Vaccine (1) 08/01/2022    Depression Screen  10/20/2022

## 2023-01-18 LAB
25(OH)D3 SERPL-MCNC: 24.2 NG/ML (ref 30–100)
ALBUMIN SERPL-MCNC: 4 G/DL (ref 3.5–5)
ALBUMIN/GLOB SERPL: 1.4 (ref 1.1–2.2)
ALP SERPL-CCNC: 72 U/L (ref 45–117)
ALT SERPL-CCNC: 34 U/L (ref 12–78)
ANION GAP SERPL CALC-SCNC: 4 MMOL/L (ref 5–15)
AST SERPL-CCNC: 17 U/L (ref 15–37)
BILIRUB SERPL-MCNC: 0.3 MG/DL (ref 0.2–1)
BUN SERPL-MCNC: 18 MG/DL (ref 6–20)
BUN/CREAT SERPL: 15 (ref 12–20)
CALCIUM SERPL-MCNC: 9.2 MG/DL (ref 8.5–10.1)
CHLORIDE SERPL-SCNC: 105 MMOL/L (ref 97–108)
CHOLEST SERPL-MCNC: 212 MG/DL
CO2 SERPL-SCNC: 31 MMOL/L (ref 21–32)
CREAT SERPL-MCNC: 1.19 MG/DL (ref 0.7–1.3)
ERYTHROCYTE [DISTWIDTH] IN BLOOD BY AUTOMATED COUNT: 13.3 % (ref 11.5–14.5)
GLOBULIN SER CALC-MCNC: 2.8 G/DL (ref 2–4)
GLUCOSE SERPL-MCNC: 122 MG/DL (ref 65–100)
HCT VFR BLD AUTO: 41.7 % (ref 36.6–50.3)
HCV AB SERPL QL IA: NONREACTIVE
HDLC SERPL-MCNC: 41 MG/DL
HDLC SERPL: 5.2 (ref 0–5)
HGB BLD-MCNC: 13.1 G/DL (ref 12.1–17)
LDLC SERPL CALC-MCNC: 103.6 MG/DL (ref 0–100)
MCH RBC QN AUTO: 28.7 PG (ref 26–34)
MCHC RBC AUTO-ENTMCNC: 31.4 G/DL (ref 30–36.5)
MCV RBC AUTO: 91.4 FL (ref 80–99)
NRBC # BLD: 0 K/UL (ref 0–0.01)
NRBC BLD-RTO: 0 PER 100 WBC
PLATELET # BLD AUTO: 320 K/UL (ref 150–400)
PMV BLD AUTO: 11.3 FL (ref 8.9–12.9)
POTASSIUM SERPL-SCNC: 5 MMOL/L (ref 3.5–5.1)
PROT SERPL-MCNC: 6.8 G/DL (ref 6.4–8.2)
PSA SERPL-MCNC: 0.2 NG/ML (ref 0.01–4)
RBC # BLD AUTO: 4.56 M/UL (ref 4.1–5.7)
SODIUM SERPL-SCNC: 140 MMOL/L (ref 136–145)
TRIGL SERPL-MCNC: 337 MG/DL (ref ?–150)
TSH SERPL DL<=0.05 MIU/L-ACNC: 1.35 UIU/ML (ref 0.36–3.74)
VLDLC SERPL CALC-MCNC: 67.4 MG/DL
WBC # BLD AUTO: 6.9 K/UL (ref 4.1–11.1)

## 2023-01-18 RX ORDER — ACETAMINOPHEN 500 MG
2000 TABLET ORAL 2 TIMES DAILY
Qty: 90 CAPSULE | Refills: 4 | Status: SHIPPED | OUTPATIENT
Start: 2023-01-18

## 2023-03-10 RX ORDER — FINASTERIDE 1 MG/1
TABLET, FILM COATED ORAL
Qty: 90 TABLET | Refills: 0 | Status: SHIPPED | OUTPATIENT
Start: 2023-03-10

## 2023-03-10 NOTE — TELEPHONE ENCOUNTER
Requested Prescriptions     Pending Prescriptions Disp Refills    finasteride (PROPECIA) 1 mg tablet [Pharmacy Med Name: Finasteride Oral Tablet 1 MG] 90 Tablet 0     Sig: TAKE 1 TABLET BY MOUTH ONE TIME DAILY.  MUST MAKE APPT WITH DOCTOR 97.5

## 2023-03-15 ENCOUNTER — TELEPHONE (OUTPATIENT)
Dept: FAMILY MEDICINE CLINIC | Age: 58
End: 2023-03-15

## 2023-03-15 NOTE — TELEPHONE ENCOUNTER
----- Message from Dominic Stern sent at 3/14/2023 12:55 PM EDT -----  Subject: Message to Provider    QUESTIONS  Information for Provider? Patient has sinus infection with headache nasal   congestion with yellow and green mucus, sinus pressure, runny nose and   drainage. Patient would like an antibiotic sent to his pharmacy for pick   up or something otc. Please call patient to let him know if he needs to   make an appt or if the meds were called in.  ---------------------------------------------------------------------------  --------------  [employer] [employer]  [phone]; OK to leave message on voicemail  ---------------------------------------------------------------------------  --------------  SCRIPT ANSWERS  Relationship to Patient?  Self

## 2023-03-16 NOTE — TELEPHONE ENCOUNTER
Spoke to pt on 3.16.23 asked the pt first if he had taken a Covid Test? Pt stated that he had not I informed him that he would need to do that before he could come into the office. Or that we could schedule him for a VV,pt then stated no he is feeling a little better today and is going to ride it out.

## 2023-04-28 ENCOUNTER — OFFICE VISIT (OUTPATIENT)
Dept: FAMILY MEDICINE CLINIC | Age: 58
End: 2023-04-28

## 2023-04-28 VITALS
RESPIRATION RATE: 16 BRPM | TEMPERATURE: 97.8 F | DIASTOLIC BLOOD PRESSURE: 78 MMHG | WEIGHT: 219.6 LBS | BODY MASS INDEX: 29.74 KG/M2 | HEART RATE: 48 BPM | HEIGHT: 72 IN | SYSTOLIC BLOOD PRESSURE: 138 MMHG | OXYGEN SATURATION: 100 %

## 2023-04-28 DIAGNOSIS — Z76.89 ENCOUNTER TO ESTABLISH CARE: Primary | ICD-10-CM

## 2023-04-28 DIAGNOSIS — E78.2 MIXED HYPERLIPIDEMIA: ICD-10-CM

## 2023-04-28 DIAGNOSIS — R73.01 IMPAIRED FASTING GLUCOSE: ICD-10-CM

## 2023-04-28 DIAGNOSIS — Z86.79 HISTORY OF PSVT (PAROXYSMAL SUPRAVENTRICULAR TACHYCARDIA): ICD-10-CM

## 2023-04-28 DIAGNOSIS — R10.31 RLQ ABDOMINAL PAIN: ICD-10-CM

## 2023-04-28 LAB
ALBUMIN SERPL-MCNC: 4.2 G/DL (ref 3.5–5)
ALBUMIN/GLOB SERPL: 1.4 (ref 1.1–2.2)
ALP SERPL-CCNC: 64 U/L (ref 45–117)
ALT SERPL-CCNC: 37 U/L (ref 12–78)
AMYLASE SERPL-CCNC: 69 U/L (ref 25–115)
ANION GAP SERPL CALC-SCNC: 1 MMOL/L (ref 5–15)
AST SERPL-CCNC: 20 U/L (ref 15–37)
BILIRUB SERPL-MCNC: 0.6 MG/DL (ref 0.2–1)
BUN SERPL-MCNC: 16 MG/DL (ref 6–20)
BUN/CREAT SERPL: 16 (ref 12–20)
CALCIUM SERPL-MCNC: 9.6 MG/DL (ref 8.5–10.1)
CHLORIDE SERPL-SCNC: 106 MMOL/L (ref 97–108)
CHOLEST SERPL-MCNC: 213 MG/DL
CO2 SERPL-SCNC: 30 MMOL/L (ref 21–32)
CREAT SERPL-MCNC: 1.02 MG/DL (ref 0.7–1.3)
EST. AVERAGE GLUCOSE BLD GHB EST-MCNC: 114 MG/DL
GLOBULIN SER CALC-MCNC: 3.1 G/DL (ref 2–4)
GLUCOSE SERPL-MCNC: 97 MG/DL (ref 65–100)
HBA1C MFR BLD: 5.6 % (ref 4–5.6)
HDLC SERPL-MCNC: 60 MG/DL
HDLC SERPL: 3.6 (ref 0–5)
LDLC SERPL CALC-MCNC: 139.6 MG/DL (ref 0–100)
LIPASE SERPL-CCNC: 101 U/L (ref 73–393)
POTASSIUM SERPL-SCNC: 4.8 MMOL/L (ref 3.5–5.1)
PROT SERPL-MCNC: 7.3 G/DL (ref 6.4–8.2)
SODIUM SERPL-SCNC: 137 MMOL/L (ref 136–145)
TRIGL SERPL-MCNC: 67 MG/DL (ref ?–150)
VLDLC SERPL CALC-MCNC: 13.4 MG/DL

## 2023-04-28 NOTE — PROGRESS NOTES
[address] [facility] Note     Simon Washington Sr. (: 1965) is a 63 y.o. male, established patient, here for evaluation of the following chief complaint(s):  Abdominal Pain (LRQ on and off for years)       ASSESSMENT/PLAN:  1. Encounter to establish care    2. RLQ abdominal pain  -     METABOLIC PANEL, COMPREHENSIVE; Future  -     HEMOGLOBIN A1C WITH EAG; Future  -     LIPID PANEL; Future  -     AMYLASE; Future  -     LIPASE; Future  -     US ABD COMP; Future    3. History of PSVT (paroxysmal supraventricular tachycardia)  Assessment & Plan:  Record from [place]: 12/15/1994 EP study and ablation of concealed left posteroseptal pathway   repeat EP study without accessory pathway or SVT    4. Mixed hyperlipidemia  -     Will calculate ASCVD upon review of updated labs. -     LIPID PANEL; Future    5. Impaired fasting glucose  -     HEMOGLOBIN A1C WITH EAG; Future          Return if symptoms worsen or fail to improve. SUBJECTIVE/OBJECTIVE:    Rogerio Haywood is a 63 y.o. male seen today to establish care with new provider as previous PCP is no longer with the practice and further evaluation of abdominal pain. Abdominal pain:  History of right lower quadrant abdominal and groin pain that comes and goes since 2019. Mr. Radha Zhong underwent a CT of the abdomen and pelvis in 2019 which was unremarkable. He has also had his colonoscopy and appendectomy recently in the fall . Denies unexplained weight loss. Denies fever. Denies nausea, vomiting, diarrhea or blood in the stool. No history of known hernias. REVIEW OF SYSTEMS:    Review of Systems   Gastrointestinal:  Positive for abdominal pain (RLQ & groin). All other systems reviewed and are negative.       VITAL SIGNS:    Wt Readings from Last 3 Encounters:   23 219 lb 9.6 oz (99.6 kg)   23 222 lb 12.8 oz (101.1 kg)   10/20/21 223 lb 1.6 oz (101.2 kg)     Temp Readings from Last 3 Encounters:   23 97.8 °F (36.6 °C) (Temporal)   01/17/23 98.2 °F (36.8 °C) (Temporal)   10/20/21 97.7 °F (36.5 °C) (Temporal)     BP Readings from Last 3 Encounters:   04/28/23 138/78   01/17/23 123/72   10/20/21 138/78     Pulse Readings from Last 3 Encounters:   04/28/23 (!) 48   01/17/23 64   10/20/21 (!) 42           PHYSICAL EXAMINATION:       General: Alert, cooperative, no distress  Respiratory: Breathing comfortably, in no acute respiratory distress. Clear to auscultation bilaterally. Cardiovascular: Regular rate and rhythm, S1, S2 normal, no murmur, click, rub or gallop. Extremities: no edema. Abdomen: Soft, non-tender, not distended. Bowel sounds normal. No masses or organomegaly. MSK: Extremities normal appearing, atraumatic, no effusion. Gait steady and unassisted. Skin: Skin color, texture, turgor normal. No rashes or lesions on exposed skin. Neurologic: A/Ox3  Psychiatric: Normal affect. Mood euthymic. Thoughts logical. Speech volume and speed normal            Treatment risks/benefits/costs/interactions/alternatives discussed with patient. Advised patient to call back or return to office if symptoms worsen/change/persist. If patient cannot reach us or should anything more severe/urgent arise he/she should proceed directly to the nearest emergency department. Discussed expected course/resolution/complications of diagnosis in detail with patient. Patient expressed understanding with the diagnosis and plan. An electronic signature was used to authenticate this note.   -- Melissa Jeans, NP

## 2023-04-28 NOTE — PROGRESS NOTES
Chief Complaint   Patient presents with    Abdominal Pain     LRQ on and off for years         1. \"Have you been to the ER, urgent care clinic since your last visit? Hospitalized since your last visit? \" No    2. \"Have you seen or consulted any other health care providers outside of the [address] [place] [address] since your last visit? \" No     3. For patients over 39: Has the patient had a colorectal cancer screening?  Yes - no Care Gap present       3 most recent PHQ Screens 4/28/2023   Little interest or pleasure in doing things Not at all   Feeling down, depressed, irritable, or hopeless Not at all   Total Score PHQ 2 0   Trouble falling or staying asleep, or sleeping too much -   Feeling tired or having little energy -   Poor appetite, weight loss, or overeating -   Feeling bad about yourself - or that you are a failure or have let yourself or your family down -   Trouble concentrating on things such as school, work, reading, or watching TV -   Moving or speaking so slowly that other people could have noticed; or the opposite being so fidgety that others notice -   Thoughts of being better off dead, or hurting yourself in some way -   PHQ 9 Score -   How difficult have these problems made it for you to do your work, take care of your home and get along with others -       Health Maintenance Due   Topic Date Due    COVID-19 Vaccine (1) Never done    Pneumococcal 0-64 years (1 - PCV) Never done    Shingles Vaccine (1 of 2) Never done

## 2023-05-02 ENCOUNTER — PATIENT MESSAGE (OUTPATIENT)
Dept: FAMILY MEDICINE CLINIC | Age: 58
End: 2023-05-02

## 2023-05-03 RX ORDER — ATORVASTATIN CALCIUM 20 MG/1
20 TABLET, FILM COATED ORAL DAILY
Qty: 90 TABLET | Refills: 1 | Status: SHIPPED | OUTPATIENT
Start: 2023-05-03

## 2023-05-24 RX ORDER — ATORVASTATIN CALCIUM 20 MG/1
20 TABLET, FILM COATED ORAL DAILY
COMMUNITY
Start: 2023-05-03

## 2023-05-24 RX ORDER — LORATADINE 10 MG/1
10 TABLET ORAL DAILY
COMMUNITY

## 2023-05-24 RX ORDER — OMEPRAZOLE 20 MG/1
20 CAPSULE, DELAYED RELEASE ORAL DAILY
COMMUNITY
Start: 2023-01-17

## 2023-05-24 RX ORDER — CETIRIZINE HYDROCHLORIDE 10 MG/1
10 TABLET ORAL
COMMUNITY

## 2023-05-24 RX ORDER — MAGNESIUM OXIDE 400 MG/1
400 TABLET ORAL DAILY
COMMUNITY
Start: 2023-01-17

## 2023-05-24 RX ORDER — FLUTICASONE PROPIONATE 50 MCG
SPRAY, SUSPENSION (ML) NASAL
COMMUNITY
Start: 2018-05-21

## 2023-05-24 RX ORDER — FINASTERIDE 1 MG/1
TABLET, FILM COATED ORAL
COMMUNITY
Start: 2023-03-10 | End: 2023-07-21

## 2023-05-24 RX ORDER — CLONAZEPAM 0.5 MG/1
0.5 TABLET ORAL
COMMUNITY
Start: 2021-10-20

## 2023-07-19 NOTE — TELEPHONE ENCOUNTER
PCP: WANDA Morris NP    Last appt: 4/28/2023   No future appointments. Requested Prescriptions     Pending Prescriptions Disp Refills    finasteride (PROPECIA) 1 MG tablet [Pharmacy Med Name: Finasteride Oral Tablet 1 MG] 90 tablet 0     Sig: TAKE 1 TABLET BY MOUTH ONE TIME DAILY. MUST MAKE APPT WITH DOCTOR.          Prior labs and Blood pressures:  BP Readings from Last 3 Encounters:   04/28/23 138/78   01/17/23 123/72   10/20/21 138/78     Lab Results   Component Value Date/Time     04/28/2023 08:57 AM    K 4.8 04/28/2023 08:57 AM     04/28/2023 08:57 AM    CO2 30 04/28/2023 08:57 AM    BUN 16 04/28/2023 08:57 AM    GFRAA >60 10/20/2021 11:26 AM     No results found for: HBA1C, [id number]  Lab Results   Component Value Date/Time    CHOL 213 04/28/2023 08:57 AM    HDL 60 04/28/2023 08:57 AM     No results found for: VITD3, VD3RIA    Lab Results   Component Value Date/Time    TSH 1.35 01/17/2023 04:10 PM

## 2023-07-19 NOTE — TELEPHONE ENCOUNTER
Last appointment: 4/28/23 NP Karis Shelton  Next appointment: No show 5/22/23  Previous refill encounter(s): 1/17/23 30 + 2 (last refill 6/5/23)    Requested Prescriptions     Pending Prescriptions Disp Refills    magnesium oxide (MAG-OX) 400 MG tablet 90 tablet 1     Sig: Take 1 tablet by mouth daily     For Pharmacy Admin Tracking Only    Program: Medication Refill  CPA in place:    Recommendation Provided To:    Intervention Detail: New Rx: 1, reason: Patient Preference  Intervention Accepted By:   Chloe Martins Closed?:    Time Spent (min): 5 Non family member

## 2023-07-21 RX ORDER — FINASTERIDE 1 MG/1
TABLET, FILM COATED ORAL
Qty: 90 TABLET | Refills: 0 | Status: SHIPPED | OUTPATIENT
Start: 2023-07-21

## 2023-07-21 RX ORDER — MAGNESIUM OXIDE 400 MG/1
400 TABLET ORAL DAILY
Qty: 90 TABLET | Refills: 1 | OUTPATIENT
Start: 2023-07-21

## 2023-10-30 RX ORDER — FINASTERIDE 1 MG/1
TABLET, FILM COATED ORAL
Qty: 90 TABLET | Refills: 0 | OUTPATIENT
Start: 2023-10-30

## 2023-10-30 NOTE — TELEPHONE ENCOUNTER
PCP: Justin Joseph, APRN - NP    Last appt: 4/28/2023   No future appointments.     Requested Prescriptions     Pending Prescriptions Disp Refills    finasteride (PROPECIA) 1 MG tablet 90 tablet 0         Prior labs and Blood pressures:  BP Readings from Last 3 Encounters:   04/28/23 138/78   01/17/23 123/72   10/20/21 138/78     Lab Results   Component Value Date/Time     04/28/2023 08:57 AM    K 4.8 04/28/2023 08:57 AM     04/28/2023 08:57 AM    CO2 30 04/28/2023 08:57 AM    BUN 16 04/28/2023 08:57 AM    GFRAA >60 10/20/2021 11:26 AM     No results found for: \"HBA1C\", \"[id number]\"  Lab Results   Component Value Date/Time    CHOL 213 04/28/2023 08:57 AM    HDL 60 04/28/2023 08:57 AM     No results found for: \"VITD3\", \"VD3RIA\"    Lab Results   Component Value Date/Time    TSH 1.35 01/17/2023 04:10 PM

## 2023-11-02 NOTE — TELEPHONE ENCOUNTER
Patient call for refill finasteride.  Noted NP Campoverde refused rx refill and requested appointment.    REVENTIVE message sent to patient as below:    We received your refill request and call for finasteride.      NP Nirali is requesting an appointment be scheduled for this refill.  Noted your last office visit was 4/28/23.      Please contact the office to schedule and once scheduled, can refill til your appointment.  [phone]  ThanksEvelyn    For Pharmacy Admin Tracking Only    Program: Medication Refill  CPA in place:    Recommendation Provided To:   Intervention Detail: New Rx: 1, reason: Patient Preference  Intervention Accepted By:   Gap Closed?:    Time Spent (min): 5

## 2023-11-07 RX ORDER — FINASTERIDE 1 MG/1
TABLET, FILM COATED ORAL
Qty: 90 TABLET | Refills: 0 | OUTPATIENT
Start: 2023-11-07

## 2023-11-07 NOTE — TELEPHONE ENCOUNTER
PCP: Alley Campoverde APRN - NP    Last appt: 4/28/2023   Future Appointments   Date Time Provider Department Center   11/9/2023  5:30 PM Alley Campoverde APRN - NP [facility]       Requested Prescriptions     Pending Prescriptions Disp Refills    finasteride (PROPECIA) 1 MG tablet [Pharmacy Med Name: Finasteride Oral Tablet 1 MG] 90 tablet 0     Sig: TAKE 1 TABLET BY MOUTH ONE TIME DAILY. MUST MAKE APPT WITH DOCTOR.         Prior labs and Blood pressures:  BP Readings from Last 3 Encounters:   04/28/23 138/78   01/17/23 123/72   10/20/21 138/78     Lab Results   Component Value Date/Time     04/28/2023 08:57 AM    K 4.8 04/28/2023 08:57 AM     04/28/2023 08:57 AM    CO2 30 04/28/2023 08:57 AM    BUN 16 04/28/2023 08:57 AM    GFRAA >60 10/20/2021 11:26 AM     No results found for: \"HBA1C\", \"FQO4SETE\"  Lab Results   Component Value Date/Time    CHOL 213 04/28/2023 08:57 AM    HDL 60 04/28/2023 08:57 AM     No results found for: \"VITD3\", \"VD3RIA\"    Lab Results   Component Value Date/Time    TSH 1.35 01/17/2023 04:10 PM

## 2023-11-09 ENCOUNTER — TELEMEDICINE (OUTPATIENT)
Age: 58
End: 2023-11-09
Payer: COMMERCIAL

## 2023-11-09 DIAGNOSIS — L65.9 HAIR THINNING: ICD-10-CM

## 2023-11-09 DIAGNOSIS — E78.2 MIXED HYPERLIPIDEMIA: Primary | ICD-10-CM

## 2023-11-09 PROCEDURE — G8427 DOCREV CUR MEDS BY ELIG CLIN: HCPCS | Performed by: NURSE PRACTITIONER

## 2023-11-09 PROCEDURE — 3017F COLORECTAL CA SCREEN DOC REV: CPT | Performed by: NURSE PRACTITIONER

## 2023-11-09 PROCEDURE — 99213 OFFICE O/P EST LOW 20 MIN: CPT | Performed by: NURSE PRACTITIONER

## 2023-11-09 RX ORDER — FINASTERIDE 1 MG/1
TABLET, FILM COATED ORAL
Qty: 90 TABLET | Refills: 1 | Status: SHIPPED | OUTPATIENT
Start: 2023-11-09

## 2023-11-09 RX ORDER — ATORVASTATIN CALCIUM 10 MG/1
10 TABLET, FILM COATED ORAL DAILY
Qty: 90 TABLET | Refills: 1 | Status: SHIPPED | OUTPATIENT
Start: 2023-11-09

## 2023-11-09 SDOH — ECONOMIC STABILITY: FOOD INSECURITY: WITHIN THE PAST 12 MONTHS, YOU WORRIED THAT YOUR FOOD WOULD RUN OUT BEFORE YOU GOT MONEY TO BUY MORE.: NEVER TRUE

## 2023-11-09 SDOH — ECONOMIC STABILITY: FOOD INSECURITY: WITHIN THE PAST 12 MONTHS, THE FOOD YOU BOUGHT JUST DIDN'T LAST AND YOU DIDN'T HAVE MONEY TO GET MORE.: NEVER TRUE

## 2023-11-09 SDOH — ECONOMIC STABILITY: HOUSING INSECURITY
IN THE LAST 12 MONTHS, WAS THERE A TIME WHEN YOU DID NOT HAVE A STEADY PLACE TO SLEEP OR SLEPT IN A SHELTER (INCLUDING NOW)?: NO

## 2023-11-09 SDOH — ECONOMIC STABILITY: INCOME INSECURITY: HOW HARD IS IT FOR YOU TO PAY FOR THE VERY BASICS LIKE FOOD, HOUSING, MEDICAL CARE, AND HEATING?: NOT VERY HARD

## 2023-11-09 ASSESSMENT — PATIENT HEALTH QUESTIONNAIRE - PHQ9
2. FEELING DOWN, DEPRESSED OR HOPELESS: 0
SUM OF ALL RESPONSES TO PHQ QUESTIONS 1-9: 0
SUM OF ALL RESPONSES TO PHQ QUESTIONS 1-9: 0
SUM OF ALL RESPONSES TO PHQ9 QUESTIONS 1 & 2: 0
1. LITTLE INTEREST OR PLEASURE IN DOING THINGS: 0
SUM OF ALL RESPONSES TO PHQ QUESTIONS 1-9: 0
SUM OF ALL RESPONSES TO PHQ QUESTIONS 1-9: 0

## 2023-11-09 NOTE — PROGRESS NOTES
[facility] Level [facility] Note    Cristian George Sr. (:  1965) is a Established patient, presenting virtually for evaluation of the following:      ASSESSMENT & PLAN:    Mixed hyperlipidemia  -     atorvastatin (LIPITOR) 10 MG tablet; Take 1 tablet by mouth daily  -     Lipid Panel; Future  -     Comprehensive Metabolic Panel; Future  -Last lipid panel collected 2023: T.cholesterol 213, HDL 60, .6, trig 67. At the time the ASCVD risk score was at 12.2%. We will recalculate upon updated labs. Hair thinning  -     finasteride (PROPECIA) 1 MG tablet; TAKE 1 TABLET BY MOUTH ONE TIME DAILY        Return in about 6 months (around 2024) for patient to schedule lab appointment with in next 2 weeks. SUBJECTIVE:    Rk Bone is seen today for   Chief Complaint   Patient presents with    Cholesterol Problem    Medication Refill     Mr. Nyla Cruz is seen today in follow-up for hyperlipidemia and hair thinning. He did start atorvastatin as recommended but has been cutting the dose in half to equal 10 mg. He is able to tolerate this dose without side effect. Requesting refill on medication. REVIEW OF SYSYEMS:  Review of Systems   All other systems reviewed and are negative.            OBJECTIVE:    Patient-Reported Vitals  No data recorded     Physical Exam  [INSTRUCTIONS:  \"[x]\" Indicates a positive item  \"[]\" Indicates a negative item  -- DELETE ALL ITEMS NOT EXAMINED]    Constitutional: [x] Appears well-developed and well-nourished [x] No apparent distress      [] Abnormal -     Mental status: [x] Alert and awake  [x] Oriented to person/place/time [x] Able to follow commands    [] Abnormal -     Eyes:   EOM    [x]  Normal    [] Abnormal -   Sclera  [x]  Normal    [] Abnormal -          Discharge [x]  None visible   [] Abnormal -     HENT: [x] Normocephalic, atraumatic  [] Abnormal -   [x] Mouth/Throat: Mucous membranes are moist    External Ears [x]

## 2023-11-14 ENCOUNTER — NURSE ONLY (OUTPATIENT)
Age: 58
End: 2023-11-14

## 2023-11-14 DIAGNOSIS — E78.2 MIXED HYPERLIPIDEMIA: ICD-10-CM

## 2023-11-14 LAB
ALBUMIN SERPL-MCNC: 4.3 G/DL (ref 3.5–5)
ALBUMIN/GLOB SERPL: 1.4 (ref 1.1–2.2)
ALP SERPL-CCNC: 64 U/L (ref 45–117)
ALT SERPL-CCNC: 41 U/L (ref 12–78)
ANION GAP SERPL CALC-SCNC: 2 MMOL/L (ref 5–15)
AST SERPL-CCNC: 18 U/L (ref 15–37)
BILIRUB SERPL-MCNC: 0.6 MG/DL (ref 0.2–1)
BUN SERPL-MCNC: 17 MG/DL (ref 6–20)
BUN/CREAT SERPL: 17 (ref 12–20)
CALCIUM SERPL-MCNC: 9.5 MG/DL (ref 8.5–10.1)
CHLORIDE SERPL-SCNC: 104 MMOL/L (ref 97–108)
CHOLEST SERPL-MCNC: 185 MG/DL
CO2 SERPL-SCNC: 32 MMOL/L (ref 21–32)
CREAT SERPL-MCNC: 0.98 MG/DL (ref 0.7–1.3)
GLOBULIN SER CALC-MCNC: 3.1 G/DL (ref 2–4)
GLUCOSE SERPL-MCNC: 91 MG/DL (ref 65–100)
HDLC SERPL-MCNC: 57 MG/DL
HDLC SERPL: 3.2 (ref 0–5)
LDLC SERPL CALC-MCNC: 103.2 MG/DL (ref 0–100)
POTASSIUM SERPL-SCNC: 4.7 MMOL/L (ref 3.5–5.1)
PROT SERPL-MCNC: 7.4 G/DL (ref 6.4–8.2)
SODIUM SERPL-SCNC: 138 MMOL/L (ref 136–145)
TRIGL SERPL-MCNC: 124 MG/DL
VLDLC SERPL CALC-MCNC: 24.8 MG/DL

## 2024-05-07 PROBLEM — K21.9 GERD (GASTROESOPHAGEAL REFLUX DISEASE): Status: ACTIVE | Noted: 2024-05-07

## 2024-05-07 PROBLEM — I47.10 PSVT (PAROXYSMAL SUPRAVENTRICULAR TACHYCARDIA) (HCC): Status: ACTIVE | Noted: 2017-07-25

## 2024-05-07 PROBLEM — K63.89 OTHER SPECIFIED DISEASES OF INTESTINE: Status: ACTIVE | Noted: 2021-12-16

## 2024-05-08 ENCOUNTER — OFFICE VISIT (OUTPATIENT)
Age: 59
End: 2024-05-08
Payer: COMMERCIAL

## 2024-05-08 VITALS
WEIGHT: 224 LBS | DIASTOLIC BLOOD PRESSURE: 80 MMHG | OXYGEN SATURATION: 100 % | HEIGHT: 72 IN | SYSTOLIC BLOOD PRESSURE: 168 MMHG | HEART RATE: 43 BPM | RESPIRATION RATE: 16 BRPM | TEMPERATURE: 97.7 F | BODY MASS INDEX: 30.34 KG/M2

## 2024-05-08 DIAGNOSIS — R03.0 ELEVATED BLOOD PRESSURE READING WITHOUT DIAGNOSIS OF HYPERTENSION: Primary | ICD-10-CM

## 2024-05-08 PROCEDURE — 99213 OFFICE O/P EST LOW 20 MIN: CPT | Performed by: NURSE PRACTITIONER

## 2024-05-08 PROCEDURE — 1036F TOBACCO NON-USER: CPT | Performed by: NURSE PRACTITIONER

## 2024-05-08 PROCEDURE — G8417 CALC BMI ABV UP PARAM F/U: HCPCS | Performed by: NURSE PRACTITIONER

## 2024-05-08 PROCEDURE — G8427 DOCREV CUR MEDS BY ELIG CLIN: HCPCS | Performed by: NURSE PRACTITIONER

## 2024-05-08 PROCEDURE — 3017F COLORECTAL CA SCREEN DOC REV: CPT | Performed by: NURSE PRACTITIONER

## 2024-05-08 ASSESSMENT — PATIENT HEALTH QUESTIONNAIRE - PHQ9
SUM OF ALL RESPONSES TO PHQ QUESTIONS 1-9: 0
SUM OF ALL RESPONSES TO PHQ QUESTIONS 1-9: 0
2. FEELING DOWN, DEPRESSED OR HOPELESS: NOT AT ALL
SUM OF ALL RESPONSES TO PHQ QUESTIONS 1-9: 0
SUM OF ALL RESPONSES TO PHQ QUESTIONS 1-9: 0
1. LITTLE INTEREST OR PLEASURE IN DOING THINGS: NOT AT ALL
SUM OF ALL RESPONSES TO PHQ9 QUESTIONS 1 & 2: 0

## 2024-05-08 NOTE — PROGRESS NOTES
Chief Complaint   Patient presents with    Hypertension     Pt reports readings of 205/101 and 185/97       \"Have you been to the ER, urgent care clinic since your last visit?  Hospitalized since your last visit?\"    NO    “Have you seen or consulted any other health care providers outside of [facility] since your last visit?”    NO      Click Here for Release of Records Request          5/8/2024     9:29 AM   PHQ-9    Little interest or pleasure in doing things 0   Feeling down, depressed, or hopeless 0   PHQ-2 Score 0   PHQ-9 Total Score 0       Health Maintenance Due   Topic Date Due    Hepatitis B vaccine (1 of 3 - 3-dose series) Never done    HIV screen  Never done    Shingles vaccine (1 of 2) Never done    COVID-19 Vaccine (2 - 2023-24 season) 09/01/2023     
Encounters:   05/08/24 (!) 168/80   04/28/23 138/78   01/17/23 123/72     Pulse Readings from Last 3 Encounters:   05/08/24 (!) 43   04/28/23 (!) 48   01/17/23 64         Results          PHYSICAL EXAMINATION:     Physical Exam  Vitals and nursing note reviewed.   Constitutional:       Appearance: Normal appearance.   Eyes:      General:         Right eye: No discharge.         Left eye: No discharge.   Cardiovascular:      Rate and Rhythm: Normal rate and regular rhythm.      Heart sounds: Normal heart sounds. No murmur heard.     No friction rub. No gallop.   Pulmonary:      Effort: No respiratory distress.      Breath sounds: No wheezing, rhonchi or rales.   Musculoskeletal:         General: Normal range of motion.      Right lower leg: No edema.      Left lower leg: No edema.   Skin:     Capillary Refill: Capillary refill takes less than 2 seconds.   Neurological:      General: No focal deficit present.      Mental Status: He is alert and oriented to person, place, and time. Mental status is at baseline.      Cranial Nerves: No cranial nerve deficit.      Motor: No weakness.                   Treatment risks/benefits/costs/interactions/alternatives discussed with patient.  Advised patient to call back or return to office if symptoms worsen/change/persist. If patient cannot reach us or should anything more severe/urgent arise he/she should proceed directly to the nearest emergency department.  Discussed expected course/resolution/complications of diagnosis in detail with patient.  Patient expressed understanding with the diagnosis and plan.     The patient (or guardian, if applicable) and other individuals in attendance with the patient were advised that Artificial Intelligence will be utilized during this visit to record and process the conversation to generate a clinical note. The patient (or guardian, if applicable) and other individuals in attendance at the appointment consented to the use of AI, including

## 2024-05-10 ENCOUNTER — NURSE ONLY (OUTPATIENT)
Age: 59
End: 2024-05-10

## 2024-05-10 ENCOUNTER — OFFICE VISIT (OUTPATIENT)
Age: 59
End: 2024-05-10
Payer: COMMERCIAL

## 2024-05-10 VITALS — SYSTOLIC BLOOD PRESSURE: 162 MMHG | DIASTOLIC BLOOD PRESSURE: 76 MMHG

## 2024-05-10 VITALS — DIASTOLIC BLOOD PRESSURE: 76 MMHG | SYSTOLIC BLOOD PRESSURE: 162 MMHG

## 2024-05-10 DIAGNOSIS — I10 UNCONTROLLED HYPERTENSION: Primary | ICD-10-CM

## 2024-05-10 PROCEDURE — 1036F TOBACCO NON-USER: CPT | Performed by: NURSE PRACTITIONER

## 2024-05-10 PROCEDURE — 99213 OFFICE O/P EST LOW 20 MIN: CPT | Performed by: NURSE PRACTITIONER

## 2024-05-10 PROCEDURE — 3078F DIAST BP <80 MM HG: CPT | Performed by: NURSE PRACTITIONER

## 2024-05-10 PROCEDURE — G8417 CALC BMI ABV UP PARAM F/U: HCPCS | Performed by: NURSE PRACTITIONER

## 2024-05-10 PROCEDURE — 3077F SYST BP >= 140 MM HG: CPT | Performed by: NURSE PRACTITIONER

## 2024-05-10 PROCEDURE — G8427 DOCREV CUR MEDS BY ELIG CLIN: HCPCS | Performed by: NURSE PRACTITIONER

## 2024-05-10 PROCEDURE — 3017F COLORECTAL CA SCREEN DOC REV: CPT | Performed by: NURSE PRACTITIONER

## 2024-05-10 RX ORDER — OLMESARTAN MEDOXOMIL 5 MG/1
5 TABLET ORAL DAILY
Qty: 30 TABLET | Refills: 1 | Status: SHIPPED | OUTPATIENT
Start: 2024-05-10

## 2024-05-10 NOTE — PROGRESS NOTES
[facility]  Clinic Note     Jeffrey G Sacks Sr. (: 1965) is a 59 y.o. male, established patient, here for evaluation of the following chief complaint(s):  Hypertension       ASSESSMENT/PLAN:  1. Uncontrolled hypertension  -Reviewed home blood pressure logs.  - START    olmesartan (BENICAR) 5 MG tablet; Take 1 tablet by mouth daily, Disp-30 tablet, R-1Normal  -Continue home blood pressure monitoring.  Goal minimum blood pressure reading <140/90  -Counseling provided as to when to seek emergent care  -Counseling provided on using over-the-counter cough and cold medicines with caution as it is likely contributing to elevated blood pressure readings.  -Collect BMP at next follow-up        Return in about 2 weeks (around 2024) for hypertension / blood pressure and labs.        SUBJECTIVE/OBJECTIVE:    Jeffrey G Sacks Sr. is a 59 y.o. male seen today for persistently elevated BP readings. Brought home logs in for review.                CURRENT MEDICATIONS:  Current Outpatient Medications   Medication Sig    olmesartan (BENICAR) 5 MG tablet Take 1 tablet by mouth daily    Coenzyme Q10 (CO Q 10 PO) Take by mouth    atorvastatin (LIPITOR) 10 MG tablet Take 1 tablet by mouth daily    finasteride (PROPECIA) 1 MG tablet TAKE 1 TABLET BY MOUTH ONE TIME DAILY    cetirizine (ZYRTEC) 10 MG tablet 1 tablet    fluticasone (FLONASE) 50 MCG/ACT nasal spray 2 sprays each nostril BID for 3 days then 2 sprays each nostril daily through summer    Cholecalciferol 50 MCG (2000) CAPS Take 2,000 Units by mouth 2 times daily (Patient not taking: Reported on 2023)    clonazePAM (KLONOPIN) 0.5 MG tablet Take 1 tablet by mouth. (Patient not taking: Reported on 2023)    omeprazole (PRILOSEC) 20 MG delayed release capsule Take 1 capsule by mouth daily (Patient not taking: Reported on 2023)     No current facility-administered medications for this visit.       REVIEW OF SYSTEMS:    Review of

## 2024-05-10 NOTE — PROGRESS NOTES
Chief Complaint   Patient presents with    Hypertension       \"Have you been to the ER, urgent care clinic since your last visit?  Hospitalized since your last visit?\"    NO    “Have you seen or consulted any other health care providers outside of [facility] since your last visit?”    NO      Click Here for Release of Records Request          5/8/2024     9:29 AM   PHQ-9    Little interest or pleasure in doing things 0   Feeling down, depressed, or hopeless 0   PHQ-2 Score 0   PHQ-9 Total Score 0       Health Maintenance Due   Topic Date Due    Hepatitis B vaccine (1 of 3 - 3-dose series) Never done    HIV screen  Never done    Shingles vaccine (1 of 2) Never done    COVID-19 Vaccine (2 - 2023-24 season) 09/01/2023

## 2024-05-18 DIAGNOSIS — L65.9 HAIR THINNING: ICD-10-CM

## 2024-05-20 NOTE — TELEPHONE ENCOUNTER
PCP: Alley Campoverde APRN - NP    Last appt: 5/10/2024   Future Appointments   Date Time Provider Department Center   5/24/2024  8:30 AM Alley Campoverde APRN - NP [facility]       Requested Prescriptions     Pending Prescriptions Disp Refills    finasteride (PROPECIA) 1 MG tablet [Pharmacy Med Name: Finasteride Oral Tablet 1 MG] 90 tablet 0     Sig: TAKE 1 TABLET BY MOUTH ONE TIME DAILY         Prior labs and Blood pressures:  BP Readings from Last 3 Encounters:   05/10/24 (!) 162/76   05/10/24 (!) 162/76   05/08/24 (!) 168/80     Lab Results   Component Value Date/Time     11/14/2023 08:26 AM    K 4.7 11/14/2023 08:26 AM     11/14/2023 08:26 AM    CO2 32 11/14/2023 08:26 AM    BUN 17 11/14/2023 08:26 AM    GFRAA >60 10/20/2021 11:26 AM     No results found for: \"HBA1C\", \"[id number]\"  Lab Results   Component Value Date/Time    CHOL 185 11/14/2023 08:26 AM    HDL 57 11/14/2023 08:26 AM    .2 11/14/2023 08:26 AM    VLDL 24.8 11/14/2023 08:26 AM     No results found for: \"VITD3\"    Lab Results   Component Value Date/Time    TSH 1.35 01/17/2023 04:10 PM

## 2024-05-21 ENCOUNTER — TELEPHONE (OUTPATIENT)
Age: 59
End: 2024-05-21

## 2024-05-21 ENCOUNTER — HOSPITAL ENCOUNTER (EMERGENCY)
Facility: HOSPITAL | Age: 59
Discharge: HOME | End: 2024-05-21
Attending: STUDENT IN AN ORGANIZED HEALTH CARE EDUCATION/TRAINING PROGRAM
Payer: COMMERCIAL

## 2024-05-21 VITALS
TEMPERATURE: 97.8 F | OXYGEN SATURATION: 99 % | BODY MASS INDEX: 30.65 KG/M2 | RESPIRATION RATE: 16 BRPM | SYSTOLIC BLOOD PRESSURE: 195 MMHG | WEIGHT: 225.97 LBS | DIASTOLIC BLOOD PRESSURE: 82 MMHG | HEART RATE: 50 BPM

## 2024-05-21 DIAGNOSIS — R03.0 ELEVATED BLOOD PRESSURE READING: Primary | ICD-10-CM

## 2024-05-21 PROCEDURE — 99282 EMERGENCY DEPT VISIT SF MDM: CPT

## 2024-05-21 RX ORDER — FINASTERIDE 1 MG/1
TABLET, FILM COATED ORAL
Qty: 90 TABLET | Refills: 0 | Status: SHIPPED | OUTPATIENT
Start: 2024-05-21

## 2024-05-21 ASSESSMENT — ENCOUNTER SYMPTOMS
COLOR CHANGE: 0
DIARRHEA: 0
SHORTNESS OF BREATH: 0
TROUBLE SWALLOWING: 0
NAUSEA: 0
COUGH: 0
ABDOMINAL PAIN: 0
BACK PAIN: 0
VOMITING: 0

## 2024-05-21 ASSESSMENT — PAIN - FUNCTIONAL ASSESSMENT: PAIN_FUNCTIONAL_ASSESSMENT: NONE - DENIES PAIN

## 2024-05-21 NOTE — TELEPHONE ENCOUNTER
Call and spoke to patient, two ID confirmed.   Writer informed patient to go to ER per Dr. Miles due to b/p looks as if it is going higher and still keep appointment with N[facility] on Friday. Pt verbalized understanding of information discussed w/ no further questions at this time. No symptoms of headache /SOB just elevated B/p.   Twyla Segura LPN

## 2024-05-21 NOTE — ED TRIAGE NOTES
Pt stated he was at [facility] and his BP was 199/101, denies headache, denies cp or sob , recently started on BP medication on the 11 th

## 2024-05-21 NOTE — ED PROVIDER NOTES
cervical adenopathy.   Skin:     General: Skin is warm and dry.      Findings: No bruising, erythema, lesion or rash.   Neurological:      Mental Status: He is alert and oriented to person, place, and time.         DIAGNOSTIC RESULTS     EKG: All EKG's are interpreted by the Emergency Department Physician who either signs or Co-signs this chart in the absence of a cardiologist.        RADIOLOGY:   Non-plain film images such as CT, Ultrasound and MRI are read by the radiologist. Plain radiographic images are visualized and preliminarily interpreted by the emergency physician with the below findings:        Interpretation per the Radiologist below, if available at the time of this note:    No orders to display        LABS:  Labs Reviewed - No data to display    All other labs were within normal range or not returned as of this dictation.    EMERGENCY DEPARTMENT COURSE and DIFFERENTIAL DIAGNOSIS/MDM:   Vitals:    Vitals:    05/21/24 1101   BP: (!) 195/82   Pulse: 50   Resp: 16   Temp: 97.8 °F (36.6 °C)   TempSrc: Oral   SpO2: 99%   Weight: 102.5 kg (225 lb 15.5 oz)           Medical Decision Making          REASSESSMENT      Patient was offered labs but refused.  He has a prescheduled appoint with his PCP on Friday.  He denies any headache, dizziness, shortness of breath or chest pain.  He was instructed to continue with his previously prescribed blood pressure medication and to keep his follow-up appointment.  Discussed plan of care with Dr. Magana.    11:27 AM  Patient's results and plan of care have been reviewed with him.  Patient has verbally conveyed his understanding and agreement of his signs, symptoms, diagnosis, treatment and prognosis and additionally agrees to follow up as recommended or return to the Emergency Room should his condition change prior to follow-up.  Discharge instructions have also been provided to the patient with some educational information regarding his diagnosis as well a list of

## 2024-05-21 NOTE — DISCHARGE INSTRUCTIONS
Please take your blood pressure medication as prescribed and have close follow up with your doctor for further evaluation

## 2024-05-24 ENCOUNTER — OFFICE VISIT (OUTPATIENT)
Age: 59
End: 2024-05-24
Payer: COMMERCIAL

## 2024-05-24 VITALS
BODY MASS INDEX: 30.2 KG/M2 | RESPIRATION RATE: 16 BRPM | SYSTOLIC BLOOD PRESSURE: 138 MMHG | OXYGEN SATURATION: 96 % | HEIGHT: 72 IN | DIASTOLIC BLOOD PRESSURE: 82 MMHG | WEIGHT: 223 LBS | TEMPERATURE: 97 F | HEART RATE: 48 BPM

## 2024-05-24 DIAGNOSIS — Z12.5 ENCOUNTER FOR SCREENING FOR MALIGNANT NEOPLASM OF PROSTATE: ICD-10-CM

## 2024-05-24 DIAGNOSIS — R73.01 IMPAIRED FASTING GLUCOSE: ICD-10-CM

## 2024-05-24 DIAGNOSIS — E78.2 MIXED HYPERLIPIDEMIA: ICD-10-CM

## 2024-05-24 DIAGNOSIS — I10 UNCONTROLLED HYPERTENSION: Primary | ICD-10-CM

## 2024-05-24 LAB
ALBUMIN SERPL-MCNC: 4.1 G/DL (ref 3.5–5)
ALBUMIN/GLOB SERPL: 1.4 (ref 1.1–2.2)
ALP SERPL-CCNC: 64 U/L (ref 45–117)
ALT SERPL-CCNC: 40 U/L (ref 12–78)
ANION GAP SERPL CALC-SCNC: 2 MMOL/L (ref 5–15)
AST SERPL-CCNC: 24 U/L (ref 15–37)
BILIRUB SERPL-MCNC: 0.6 MG/DL (ref 0.2–1)
BUN SERPL-MCNC: 16 MG/DL (ref 6–20)
BUN/CREAT SERPL: 16 (ref 12–20)
CALCIUM SERPL-MCNC: 9.6 MG/DL (ref 8.5–10.1)
CHLORIDE SERPL-SCNC: 108 MMOL/L (ref 97–108)
CHOLEST SERPL-MCNC: 163 MG/DL
CO2 SERPL-SCNC: 28 MMOL/L (ref 21–32)
CREAT SERPL-MCNC: 0.98 MG/DL (ref 0.7–1.3)
EST. AVERAGE GLUCOSE BLD GHB EST-MCNC: 103 MG/DL
GLOBULIN SER CALC-MCNC: 3 G/DL (ref 2–4)
GLUCOSE SERPL-MCNC: 96 MG/DL (ref 65–100)
HBA1C MFR BLD: 5.2 % (ref 4–5.6)
HDLC SERPL-MCNC: 65 MG/DL
HDLC SERPL: 2.5 (ref 0–5)
LDLC SERPL CALC-MCNC: 85.6 MG/DL (ref 0–100)
POTASSIUM SERPL-SCNC: 4.6 MMOL/L (ref 3.5–5.1)
PROT SERPL-MCNC: 7.1 G/DL (ref 6.4–8.2)
PSA SERPL-MCNC: 0.2 NG/ML (ref 0.01–4)
SODIUM SERPL-SCNC: 138 MMOL/L (ref 136–145)
TRIGL SERPL-MCNC: 62 MG/DL
VLDLC SERPL CALC-MCNC: 12.4 MG/DL

## 2024-05-24 PROCEDURE — G8417 CALC BMI ABV UP PARAM F/U: HCPCS | Performed by: NURSE PRACTITIONER

## 2024-05-24 PROCEDURE — 3075F SYST BP GE 130 - 139MM HG: CPT | Performed by: NURSE PRACTITIONER

## 2024-05-24 PROCEDURE — 1036F TOBACCO NON-USER: CPT | Performed by: NURSE PRACTITIONER

## 2024-05-24 PROCEDURE — G8427 DOCREV CUR MEDS BY ELIG CLIN: HCPCS | Performed by: NURSE PRACTITIONER

## 2024-05-24 PROCEDURE — 3017F COLORECTAL CA SCREEN DOC REV: CPT | Performed by: NURSE PRACTITIONER

## 2024-05-24 PROCEDURE — 3079F DIAST BP 80-89 MM HG: CPT | Performed by: NURSE PRACTITIONER

## 2024-05-24 PROCEDURE — 99214 OFFICE O/P EST MOD 30 MIN: CPT | Performed by: NURSE PRACTITIONER

## 2024-05-24 RX ORDER — OLMESARTAN MEDOXOMIL 20 MG/1
10 TABLET ORAL DAILY
Qty: 45 TABLET | Refills: 1 | Status: SHIPPED | OUTPATIENT
Start: 2024-05-24

## 2024-05-24 RX ORDER — MELOXICAM 10 MG/1
CAPSULE ORAL
COMMUNITY
Start: 2024-05-13

## 2024-05-24 ASSESSMENT — PATIENT HEALTH QUESTIONNAIRE - PHQ9
SUM OF ALL RESPONSES TO PHQ QUESTIONS 1-9: 0
SUM OF ALL RESPONSES TO PHQ QUESTIONS 1-9: 0
1. LITTLE INTEREST OR PLEASURE IN DOING THINGS: NOT AT ALL
SUM OF ALL RESPONSES TO PHQ QUESTIONS 1-9: 0
2. FEELING DOWN, DEPRESSED OR HOPELESS: NOT AT ALL
SUM OF ALL RESPONSES TO PHQ QUESTIONS 1-9: 0
SUM OF ALL RESPONSES TO PHQ9 QUESTIONS 1 & 2: 0

## 2024-05-24 NOTE — PROGRESS NOTES
Chief Complaint   Patient presents with    Blood Pressure Check     New Medication x 2 weeks ago.      \"Have you been to the ER, urgent care clinic since your last visit?  Hospitalized since your last visit?\"    YES - When: approximately 5 days ago.  Where and Why: [facility] B/p     “Have you seen or consulted any other health care providers outside of [facility] since your last visit?”    NO                   5/24/2024     8:32 AM   PHQ-9    Little interest or pleasure in doing things 0   Feeling down, depressed, or hopeless 0   PHQ-2 Score 0   PHQ-9 Total Score 0           Financial Resource Strain: Low Risk  (11/9/2023)    Overall Financial Resource Strain (CARDIA)     Difficulty of Paying Living Expenses: Not very hard      Food Insecurity: Not on file (11/9/2023)          Health Maintenance Due   Topic Date Due    Hepatitis B vaccine (1 of 3 - 3-dose series) Never done    HIV screen  Never done    Shingles vaccine (1 of 2) Never done    COVID-19 Vaccine (2 - 2023-24 season) 09/01/2023

## 2024-05-24 NOTE — PROGRESS NOTES
[facility]  Clinic Note     Jeffrey G Sacks Sr. (: 1965) is a 59 y.o. male, established patient, here for evaluation of the following chief complaint(s):  Blood Pressure Check (New Medication x 2 weeks ago. )       ASSESSMENT/PLAN:  1. Uncontrolled hypertension  - INCREASE    olmesartan (BENICAR) 20 MG tablet; Take 0.5 tablets by mouth daily, Disp-45 tablet, R-1Normal  -     Comprehensive Metabolic Panel; Future  -Continue home BP monitorinf    2. Impaired fasting glucose  -     Hemoglobin A1C; Future  Diet and lifestyle modification encouraged for weight loss and chronic disease prevention/ management  Hemoglobin A1C   Date Value Ref Range Status   2023 5.6 4.0 - 5.6 % Final       3. Mixed hyperlipidemia  -     Lipid Panel; Future  Lab Results   Component Value Date    CHOL 185 2023    TRIG 124 2023    HDL 57 2023    .2 (H) 2023    VLDL 24.8 2023    CHOLHDLRATIO 3.2 2023       4. Encounter for screening for malignant neoplasm of prostate  -     PSA Screening; Future  Lab Results   Component Value Date    PSA 0.2 2023         Return in about 6 months (around 2024) for as needed or if no improvement, hypertension / blood pressure.        SUBJECTIVE/OBJECTIVE:    Jeffrey G Sacks Sr. is a 59 y.o. male seen today for HTN.    Cardiovascular Review:  He has hypertension &HLD. Episode of significant hypertension in which he was sent to the ER. Has been taking olmesartan 5mg daily as instructed. Brought in home BP cuff for in office comparison. 158/ 82.  ---> 150/78   Diet and Lifestyle: generally follows a low fat low cholesterol diet  Home BP Monitoring: is borderline controlled at home, ranging 140's/80's.  Pertinent ROS: taking medications as instructed, no medication side effects noted, no TIA's, no chest pain on exertion, no dyspnea on exertion, no swelling of ankles.                 CURRENT MEDICATIONS:  Current Outpatient

## 2024-05-28 ENCOUNTER — TELEPHONE (OUTPATIENT)
Age: 59
End: 2024-05-28

## 2024-05-28 NOTE — TELEPHONE ENCOUNTER
Patient called having blood pressure 165/90 provider double his medication would like for the nurse to give him a call back.    Requesting a call back    Best call back [phone]

## 2024-05-29 ENCOUNTER — TELEPHONE (OUTPATIENT)
Age: 59
End: 2024-05-29

## 2024-05-29 NOTE — TELEPHONE ENCOUNTER
Pt called the office back to get scheduled for a BP Follow-up PSR from the phone room transferred the call to me.I informed the pt that SEGUNDO Campoverde stated that he could see another PCP on 5/30/24 pt refused the earlier appt,he has chosen to wait until 5/31/24 @ 8:00 to see SEGUNDO Campoverde.

## 2024-05-29 NOTE — TELEPHONE ENCOUNTER
Pt called [facility] and stated BP is still high. Pt said last week SEGUNDO Campoverde told him to increase the olmesartan medication to 20 mg pt said he has and still no change. Pt is concerned and would like to see SEGUNDO Campoverde today or Friday. Nothing available. Where can we schedule pt? Please see mg from [facility] 5/29/24

## 2024-05-29 NOTE — TELEPHONE ENCOUNTER
----- Message from Liz Rogers sent at 5/29/2024  8:44 AM EDT -----  Subject: Appointment Request    Reason for Call: Established Patient Appointment needed: Routine Existing   Condition Follow Up    QUESTIONS    Reason for appointment request? Available appointments did not meet   patient need     Additional Information for Provider? APPT REQUEST !! DISCUSS HIGH BLOOD   PRESSURE MED -- pt states his olmesartan (BENICAR) medication was   increased twice since last week. Pt states he takes his BP every morning   and every night. This morning his BP was 155/85. He states he is not   seeing med increase taking effect and wants to discuss his next options.   Pt wanted an appt for tomorrow 5/30 or Friday 5/31. Please call pt   [phone].   ---------------------------------------------------------------------------  --------------  CALL BACK INFO  [phone]; OK to leave message on voicemail  ---------------------------------------------------------------------------  --------------  SCRIPT ANSWERS

## 2024-05-31 ENCOUNTER — OFFICE VISIT (OUTPATIENT)
Age: 59
End: 2024-05-31
Payer: COMMERCIAL

## 2024-05-31 VITALS
HEART RATE: 43 BPM | BODY MASS INDEX: 29.89 KG/M2 | WEIGHT: 220.4 LBS | TEMPERATURE: 97.5 F | SYSTOLIC BLOOD PRESSURE: 138 MMHG | DIASTOLIC BLOOD PRESSURE: 74 MMHG | RESPIRATION RATE: 16 BRPM

## 2024-05-31 DIAGNOSIS — E78.2 MIXED HYPERLIPIDEMIA: ICD-10-CM

## 2024-05-31 DIAGNOSIS — I10 UNCONTROLLED HYPERTENSION: Primary | ICD-10-CM

## 2024-05-31 PROCEDURE — 3078F DIAST BP <80 MM HG: CPT | Performed by: NURSE PRACTITIONER

## 2024-05-31 PROCEDURE — 3017F COLORECTAL CA SCREEN DOC REV: CPT | Performed by: NURSE PRACTITIONER

## 2024-05-31 PROCEDURE — 3075F SYST BP GE 130 - 139MM HG: CPT | Performed by: NURSE PRACTITIONER

## 2024-05-31 PROCEDURE — G8417 CALC BMI ABV UP PARAM F/U: HCPCS | Performed by: NURSE PRACTITIONER

## 2024-05-31 PROCEDURE — G8427 DOCREV CUR MEDS BY ELIG CLIN: HCPCS | Performed by: NURSE PRACTITIONER

## 2024-05-31 PROCEDURE — 1036F TOBACCO NON-USER: CPT | Performed by: NURSE PRACTITIONER

## 2024-05-31 PROCEDURE — 99213 OFFICE O/P EST LOW 20 MIN: CPT | Performed by: NURSE PRACTITIONER

## 2024-05-31 RX ORDER — OLMESARTAN MEDOXOMIL 20 MG/1
20 TABLET ORAL DAILY
Qty: 90 TABLET | Refills: 1 | Status: SHIPPED | OUTPATIENT
Start: 2024-05-31

## 2024-05-31 RX ORDER — ATORVASTATIN CALCIUM 10 MG/1
10 TABLET, FILM COATED ORAL DAILY
Qty: 90 TABLET | Refills: 1 | Status: SHIPPED | OUTPATIENT
Start: 2024-05-31

## 2024-05-31 NOTE — PROGRESS NOTES
Chief Complaint   Patient presents with    Hypertension       \"Have you been to the ER, urgent care clinic since your last visit?  Hospitalized since your last visit?\"    NO    “Have you seen or consulted any other health care providers outside of [facility] since your last visit?”    NO      Click Here for Release of Records Request          5/24/2024     8:32 AM   PHQ-9    Little interest or pleasure in doing things 0   Feeling down, depressed, or hopeless 0   PHQ-2 Score 0   PHQ-9 Total Score 0       Health Maintenance Due   Topic Date Due    Hepatitis B vaccine (1 of 3 - 3-dose series) Never done    HIV screen  Never done    Shingles vaccine (1 of 2) Never done    COVID-19 Vaccine (2 - 2023-24 season) 09/01/2023     
    The patient (or guardian, if applicable) and other individuals in attendance with the patient were advised that Artificial Intelligence will be utilized during this visit to record and process the conversation to generate a clinical note. The patient (or guardian, if applicable) and other individuals in attendance at the appointment consented to the use of AI, including the recording.                    An electronic signature was used to authenticate this note.  -- WANDA Alvarado - NP

## 2024-06-07 ENCOUNTER — OFFICE VISIT (OUTPATIENT)
Age: 59
End: 2024-06-07
Payer: COMMERCIAL

## 2024-06-07 VITALS
BODY MASS INDEX: 29.93 KG/M2 | HEART RATE: 50 BPM | OXYGEN SATURATION: 100 % | SYSTOLIC BLOOD PRESSURE: 142 MMHG | DIASTOLIC BLOOD PRESSURE: 90 MMHG | HEIGHT: 72 IN | WEIGHT: 221 LBS

## 2024-06-07 DIAGNOSIS — I10 ESSENTIAL (PRIMARY) HYPERTENSION: Primary | ICD-10-CM

## 2024-06-07 DIAGNOSIS — Z86.79 HISTORY OF PSVT (PAROXYSMAL SUPRAVENTRICULAR TACHYCARDIA): ICD-10-CM

## 2024-06-07 DIAGNOSIS — I49.5 SSS (SICK SINUS SYNDROME) (HCC): ICD-10-CM

## 2024-06-07 PROCEDURE — G8427 DOCREV CUR MEDS BY ELIG CLIN: HCPCS | Performed by: SPECIALIST

## 2024-06-07 PROCEDURE — 3017F COLORECTAL CA SCREEN DOC REV: CPT | Performed by: SPECIALIST

## 2024-06-07 PROCEDURE — 99204 OFFICE O/P NEW MOD 45 MIN: CPT | Performed by: SPECIALIST

## 2024-06-07 PROCEDURE — 3080F DIAST BP >= 90 MM HG: CPT | Performed by: SPECIALIST

## 2024-06-07 PROCEDURE — 1036F TOBACCO NON-USER: CPT | Performed by: SPECIALIST

## 2024-06-07 PROCEDURE — 3077F SYST BP >= 140 MM HG: CPT | Performed by: SPECIALIST

## 2024-06-07 PROCEDURE — G8417 CALC BMI ABV UP PARAM F/U: HCPCS | Performed by: SPECIALIST

## 2024-06-07 RX ORDER — AMLODIPINE BESYLATE 2.5 MG/1
2.5 TABLET ORAL DAILY
Qty: 30 TABLET | Refills: 5 | Status: SHIPPED | OUTPATIENT
Start: 2024-06-07

## 2024-06-07 ASSESSMENT — PATIENT HEALTH QUESTIONNAIRE - PHQ9
SUM OF ALL RESPONSES TO PHQ QUESTIONS 1-9: 0
1. LITTLE INTEREST OR PLEASURE IN DOING THINGS: NOT AT ALL
SUM OF ALL RESPONSES TO PHQ QUESTIONS 1-9: 0
2. FEELING DOWN, DEPRESSED OR HOPELESS: NOT AT ALL
SUM OF ALL RESPONSES TO PHQ9 QUESTIONS 1 & 2: 0
SUM OF ALL RESPONSES TO PHQ QUESTIONS 1-9: 0
SUM OF ALL RESPONSES TO PHQ QUESTIONS 1-9: 0

## 2024-06-07 NOTE — PROGRESS NOTES
HISTORY OF PRESENT ILLNESS  Jeffrey G Sacks Sr. is a 59 y.o. male   He has a history of supraventricular tachycardia treated with 2 ablation procedures almost 30 years ago.  He is always had a low heart rate.  For the past month he has been diagnosed with hypertension and treated for this.  He has been on increasing doses of telmisartan but his blood pressure is still elevated.  He does not smoke cigarettes or drink alcohol to excess.  He is a  and does a lot of physical work without chest pain.  He does have some pain in his left hip.  His father had bypass surgery.  He is here for a second opinion regarding his recent onset hypertension.  Hypertension         Specialty Problems          Cardiology Problems    Hyperlipidemia        PSVT (paroxysmal supraventricular tachycardia) (HCC)        Hypertension          Current Outpatient Medications   Medication Instructions    amLODIPine (NORVASC) 2.5 mg, Oral, DAILY    atorvastatin (LIPITOR) 10 mg, Oral, DAILY    cetirizine (ZYRTEC) 10 mg    clonazePAM (KLONOPIN) 0.5 mg, Oral    Coenzyme Q10 (CO Q 10 PO) Oral    finasteride (PROPECIA) 1 MG tablet TAKE 1 TABLET BY MOUTH ONE TIME DAILY    fluticasone (FLONASE) 50 MCG/ACT nasal spray 2 sprays each nostril BID for 3 days then 2 sprays each nostril daily through summer    Meloxicam 10 MG CAPS     Multiple Vitamin (MULTIVITAMIN ADULT PO) tablet    olmesartan (BENICAR) 20 mg, Oral, DAILY      No Known Allergies  Past Medical History:   Diagnosis Date    Arrhythmia     Arthritis     Chronic pain     Contact dermatitis and other eczema, due to unspecified cause     GERD (gastroesophageal reflux disease)     Herniated disc, cervical     History of PSVT (paroxysmal supraventricular tachycardia) 7/25/2017    Record from [place]: 12/15/1994 EP study and ablation of concealed left posteroseptal pathway 1995 repeat EP study without accessory pathway or SVT    Hypercholesterolemia     Hypertension 5/1/2024     Past Surgical

## 2024-06-14 RX ORDER — AMLODIPINE BESYLATE 2.5 MG/1
5 TABLET ORAL DAILY
Qty: 30 TABLET | Refills: 5
Start: 2024-06-14

## 2024-06-19 ENCOUNTER — PATIENT MESSAGE (OUTPATIENT)
Age: 59
End: 2024-06-19

## 2024-06-19 DIAGNOSIS — I10 ESSENTIAL (PRIMARY) HYPERTENSION: Primary | ICD-10-CM

## 2024-06-19 RX ORDER — AMLODIPINE BESYLATE 5 MG/1
5 TABLET ORAL DAILY
Qty: 90 TABLET | Refills: 1 | Status: SHIPPED | OUTPATIENT
Start: 2024-06-19

## 2024-06-19 NOTE — TELEPHONE ENCOUNTER
Requested Prescriptions     Signed Prescriptions Disp Refills    amLODIPine (NORVASC) 5 MG tablet 90 tablet 1     Sig: Take 1 tablet by mouth daily Dose increase- ok per dr. [facility] 6/14/24     Authorizing Provider: ROSALIA DELGADO     Ordering User: NORY JAVRIS   Per Dr. Delgado's verbal order.

## 2024-08-15 DIAGNOSIS — L65.9 HAIR THINNING: ICD-10-CM

## 2024-08-15 NOTE — TELEPHONE ENCOUNTER
PCP: Alley Campoverde, APRN - NP    Last appt: 5/31/2024   No future appointments.    Requested Prescriptions     Pending Prescriptions Disp Refills    finasteride (PROPECIA) 1 MG tablet [Pharmacy Med Name: Finasteride Oral Tablet 1 MG] 90 tablet 0     Sig: TAKE 1 TABLET BY MOUTH ONE TIME DAILY         Prior labs and Blood pressures:  BP Readings from Last 3 Encounters:   06/07/24 (!) 142/90   05/31/24 138/74   05/24/24 138/82     Lab Results   Component Value Date/Time     05/24/2024 09:12 AM    K 4.6 05/24/2024 09:12 AM     05/24/2024 09:12 AM    CO2 28 05/24/2024 09:12 AM    BUN 16 05/24/2024 09:12 AM    GFRAA >60 10/20/2021 11:26 AM     No results found for: \"HBA1C\", \"[id number]\"  Lab Results   Component Value Date/Time    CHOL 163 05/24/2024 09:12 AM    HDL 65 05/24/2024 09:12 AM    LDL 85.6 05/24/2024 09:12 AM    .2 11/14/2023 08:26 AM    VLDL 12.4 05/24/2024 09:12 AM     No results found for: \"VITD3\"    Lab Results   Component Value Date/Time    TSH 1.35 01/17/2023 04:10 PM

## 2024-08-16 RX ORDER — FINASTERIDE 1 MG/1
TABLET, FILM COATED ORAL
Qty: 30 TABLET | Refills: 0 | Status: SHIPPED | OUTPATIENT
Start: 2024-08-16

## 2024-08-21 ENCOUNTER — OFFICE VISIT (OUTPATIENT)
Age: 59
End: 2024-08-21

## 2024-08-21 ENCOUNTER — HOSPITAL ENCOUNTER (EMERGENCY)
Facility: HOSPITAL | Age: 59
Discharge: HOME | End: 2024-08-21
Attending: EMERGENCY MEDICINE
Payer: COMMERCIAL

## 2024-08-21 ENCOUNTER — TELEPHONE (OUTPATIENT)
Age: 59
End: 2024-08-21

## 2024-08-21 ENCOUNTER — APPOINTMENT (OUTPATIENT)
Facility: HOSPITAL | Age: 59
End: 2024-08-21
Payer: COMMERCIAL

## 2024-08-21 VITALS
HEART RATE: 57 BPM | TEMPERATURE: 97.8 F | OXYGEN SATURATION: 100 % | RESPIRATION RATE: 19 BRPM | BODY MASS INDEX: 30.34 KG/M2 | WEIGHT: 223.99 LBS | HEIGHT: 72 IN | DIASTOLIC BLOOD PRESSURE: 78 MMHG | SYSTOLIC BLOOD PRESSURE: 141 MMHG

## 2024-08-21 VITALS
HEART RATE: 47 BPM | BODY MASS INDEX: 30.38 KG/M2 | TEMPERATURE: 97.9 F | WEIGHT: 224 LBS | DIASTOLIC BLOOD PRESSURE: 82 MMHG | SYSTOLIC BLOOD PRESSURE: 135 MMHG | OXYGEN SATURATION: 98 %

## 2024-08-21 DIAGNOSIS — M79.605 LEG PAIN, BILATERAL: Primary | ICD-10-CM

## 2024-08-21 DIAGNOSIS — M79.661 BILATERAL CALF PAIN: Primary | ICD-10-CM

## 2024-08-21 DIAGNOSIS — M79.662 BILATERAL CALF PAIN: Primary | ICD-10-CM

## 2024-08-21 DIAGNOSIS — M79.604 LEG PAIN, BILATERAL: Primary | ICD-10-CM

## 2024-08-21 LAB — ECHO BSA: 2.27 M2

## 2024-08-21 PROCEDURE — 99284 EMERGENCY DEPT VISIT MOD MDM: CPT

## 2024-08-21 PROCEDURE — 93970 EXTREMITY STUDY: CPT

## 2024-08-21 ASSESSMENT — ENCOUNTER SYMPTOMS
SORE THROAT: 0
SHORTNESS OF BREATH: 0
CONSTIPATION: 0

## 2024-08-21 ASSESSMENT — PAIN DESCRIPTION - DESCRIPTORS: DESCRIPTORS: CRAMPING

## 2024-08-21 ASSESSMENT — PAIN DESCRIPTION - LOCATION: LOCATION: LEG

## 2024-08-21 ASSESSMENT — PAIN SCALES - GENERAL: PAINLEVEL_OUTOF10: 2

## 2024-08-21 ASSESSMENT — PAIN DESCRIPTION - ORIENTATION: ORIENTATION: RIGHT;LEFT

## 2024-08-21 NOTE — ED TRIAGE NOTES
Pt c/o left calf \"cramping\" that started on Monday and now has cramping in his right calf. Pt drove to [place] last week, denies change in activity, but he does work outside. Pt denies swelling and redness.

## 2024-08-21 NOTE — ED PROVIDER NOTES
visualized and preliminarily interpreted by the emergency physician with the below findings:        Interpretation per the Radiologist below, if available at the time of this note:    Vascular duplex lower extremity venous bilateral   Final Result           LABS:  Labs Reviewed - No data to display    All other labs were within normal range or not returned as of this dictation.    EMERGENCY DEPARTMENT COURSE and DIFFERENTIAL DIAGNOSIS/MDM:   Vitals:    Vitals:    08/21/24 1005 08/21/24 1015   BP: (!) 161/90 (!) 141/78   Pulse: 57    Resp: 19    Temp: 97.8 °F (36.6 °C)    TempSrc: Tympanic    SpO2: 100% 100%   Weight: 101.6 kg (223 lb 15.8 oz)    Height: 1.829 m (6')            Medical Decision Making  Assessment: Patient with bilateral calf pain.  Vital signs normal.  DVT study was negative.  Did have a drive to [place] and back last week.  Is possible this is muscle soreness or strain.  No indication of cellulitis, DVT, other dangerous abnormality.  Stable for discharge home to follow-up with primary care doctor.            REASSESSMENT            CONSULTS:  None    PROCEDURES:  Unless otherwise noted below, none     Procedures      FINAL IMPRESSION      1. Bilateral calf pain          DISPOSITION/PLAN   DISPOSITION Decision To Discharge 08/21/2024 11:01:02 AM      PATIENT REFERRED TO:  Alley Campoverde, APRN - NP  [address] [facility] [address] [phone]    Schedule an appointment as soon as possible for a visit   As needed    [facility]  [address] [address] [phone]    If symptoms worsen      DISCHARGE MEDICATIONS:  New Prescriptions    No medications on file         (Please note that portions of this note were completed with a voice recognition program.  Efforts were made to edit the dictations but occasionally words are mis-transcribed.)    Bacilio Villarreal MD (electronically signed)  Emergency Attending Physician / Physician Assistant / Nurse

## 2024-08-21 NOTE — TELEPHONE ENCOUNTER
Per provider patient need to go to hospital now.  Spoke with patient, name and  verified.Patient informed of providers recommendations.    Patient verbalized understanding.

## 2024-08-21 NOTE — TELEPHONE ENCOUNTER
Pt called and stated he was seen at [facility] this morning. The provider adv pt would need to go to the hospital to get imaging done on leg to rule out blood clots. Pt would like to know if NP Alley Campoverde can refer him out to an imaging department rather than going to the hospital.     Best contact # [phone].

## 2024-08-21 NOTE — PATIENT INSTRUCTIONS
If symptoms worsens or fail to improve follow-up with PCP or ER.    Patient referred to ER for further evaluation.

## 2024-08-21 NOTE — PROGRESS NOTES
Jeffrey G Sacks Sr. (:  1965) is a 59 y.o. male,New patient, here for evaluation of the following chief complaint(s):  Leg Pain (Left leg pain. Starting in the right calf. No known injury. /X Two days )      Assessment & Plan :    Below is the assessment and plan developed based on review of history and  physical exam.     1. Leg pain, bilateral  Patient appears well, VSS, afebrile, SPO2 98% on room air. No distress noted. Patient presents with leg pain. Pain on flexion and extension in lower legs. No swelling or edema note. Noted pedial pulses. Discussed further evaluation of lower legs. Patient referred to ER for further evaluation.    These treatment recommendations were provided and printed for the patient. Patient verbalized understanding, no questions or concerns at this time.        1. Handout given with care instructions.      2. OTC for symptom management. Increase fluid intake.      3. Follow-up with PCP as needed.      4. Go to ED with development of any acute symptoms.         Follow-up    Follow-up with PCP or ER immediately for any new worsening or changes or if symptoms are not improving over the next 5-7 days.        Subjective :  HPI     59 y.o. male presents with symptoms of left leg pain x 2 days and right leg pain x 1 day. Denies any swelling or bruising. Patient denies any trauma to the legs. Patient patient rates pain 2/10 on pain scale and has not taking any medication for the pain. Patient has a history cardiac issues. Patient denies SOB or chest pain at this time.       Vitals:    24 0835   BP: 135/82   Site: Left Upper Arm   Position: Sitting   Cuff Size: Large Adult   Pulse: (!) 47   Temp: 97.9 °F (36.6 °C)   SpO2: 98%   Weight: 101.6 kg (224 lb)       No results found for this visit on 24.      Objective   Physical Exam  Constitutional:       Appearance: Normal appearance.   Cardiovascular:      Rate and Rhythm: Regular rhythm. Bradycardia present.      Pulses: Normal

## 2024-09-16 DIAGNOSIS — L65.9 HAIR THINNING: ICD-10-CM

## 2024-09-17 RX ORDER — FINASTERIDE 1 MG/1
TABLET, FILM COATED ORAL
Qty: 30 TABLET | Refills: 0 | Status: SHIPPED | OUTPATIENT
Start: 2024-09-17

## 2024-10-30 DIAGNOSIS — L65.9 HAIR THINNING: ICD-10-CM

## 2024-10-31 NOTE — TELEPHONE ENCOUNTER
PCP: Alley Campoverde, APRN - NP    Last appt: 5/31/2024   No future appointments.    Requested Prescriptions     Pending Prescriptions Disp Refills    finasteride (PROPECIA) 1 MG tablet [Pharmacy Med Name: Finasteride Oral Tablet 1 MG] 30 tablet 0     Sig: TAKE 1 TABLET BY MOUTH ONE TIME DAILY         Prior labs and Blood pressures:  BP Readings from Last 3 Encounters:   08/21/24 (!) 141/78   08/21/24 135/82   06/07/24 (!) 142/90     Lab Results   Component Value Date/Time     05/24/2024 09:12 AM    K 4.6 05/24/2024 09:12 AM     05/24/2024 09:12 AM    CO2 28 05/24/2024 09:12 AM    BUN 16 05/24/2024 09:12 AM    GFRAA >60 10/20/2021 11:26 AM     No results found for: \"HBA1C\", \"[id number]\"  Lab Results   Component Value Date/Time    CHOL 163 05/24/2024 09:12 AM    HDL 65 05/24/2024 09:12 AM    LDL 85.6 05/24/2024 09:12 AM    .2 11/14/2023 08:26 AM    VLDL 12.4 05/24/2024 09:12 AM     No results found for: \"VITD3\"    Lab Results   Component Value Date/Time    TSH 1.35 01/17/2023 04:10 PM

## 2024-11-01 RX ORDER — FINASTERIDE 1 MG/1
TABLET, FILM COATED ORAL
Qty: 30 TABLET | Refills: 0 | Status: SHIPPED | OUTPATIENT
Start: 2024-11-01

## 2024-12-02 DIAGNOSIS — L65.9 HAIR THINNING: ICD-10-CM

## 2024-12-02 NOTE — TELEPHONE ENCOUNTER
PCP: Alley Campoverde, APRN - NP    Last appt: 5/31/2024     No future appointments.    Requested Prescriptions     Pending Prescriptions Disp Refills    finasteride (PROPECIA) 1 MG tablet [Pharmacy Med Name: Finasteride Oral Tablet 1 MG] 30 tablet 0     Sig: TAKE 1 TABLET BY MOUTH ONE TIME DAILY         Prior labs and Blood pressures:  BP Readings from Last 3 Encounters:   08/21/24 (!) 141/78   08/21/24 135/82   06/07/24 (!) 142/90     Lab Results   Component Value Date/Time     05/24/2024 09:12 AM    K 4.6 05/24/2024 09:12 AM     05/24/2024 09:12 AM    CO2 28 05/24/2024 09:12 AM    BUN 16 05/24/2024 09:12 AM    GFRAA >60 10/20/2021 11:26 AM     Lab Results   Component Value Date/Time    CHOL 163 05/24/2024 09:12 AM    HDL 65 05/24/2024 09:12 AM    LDL 85.6 05/24/2024 09:12 AM    .2 11/14/2023 08:26 AM    VLDL 12.4 05/24/2024 09:12 AM     Lab Results   Component Value Date/Time    TSH 1.35 01/17/2023 04:10 PM

## 2024-12-03 RX ORDER — FINASTERIDE 1 MG/1
TABLET, FILM COATED ORAL
Qty: 30 TABLET | Refills: 0 | Status: SHIPPED | OUTPATIENT
Start: 2024-12-03

## 2024-12-04 NOTE — TELEPHONE ENCOUNTER
Sent the pt a my chart message informing him that his Propecia was approved and it's a temporary refill.SEGUNDO Campoverde need's him to make an IN Office Appt.

## 2024-12-17 DIAGNOSIS — I10 ESSENTIAL (PRIMARY) HYPERTENSION: ICD-10-CM

## 2024-12-17 RX ORDER — AMLODIPINE BESYLATE 5 MG/1
5 TABLET ORAL DAILY
Qty: 90 TABLET | Refills: 1 | Status: SHIPPED | OUTPATIENT
Start: 2024-12-17

## 2024-12-17 NOTE — TELEPHONE ENCOUNTER
Requested Prescriptions     Signed Prescriptions Disp Refills    amLODIPine (NORVASC) 5 MG tablet 90 tablet 1     Sig: Take 1 tablet by mouth daily     Authorizing Provider: ROSALIA DELGADO     Ordering User: RAND LACY per MD    Future Appointments   Date Time Provider Department Center   1/22/2025 10:00 AM Alley Campoverde, APRN - NP [facility]

## 2024-12-30 DIAGNOSIS — L65.9 HAIR THINNING: ICD-10-CM

## 2024-12-30 NOTE — TELEPHONE ENCOUNTER
PCP: Alley Campoverde APRN - NP    Last appt: 5/31/2024   Future Appointments   Date Time Provider Department Center   1/22/2025 10:00 AM Alley Campoverde APRN - NP [facility] E[facility] DEP       Requested Prescriptions     Pending Prescriptions Disp Refills    finasteride (PROPECIA) 1 MG tablet [Pharmacy Med Name: Finasteride Oral Tablet 1 MG] 30 tablet 0     Sig: TAKE 1 TABLET BY MOUTH ONE TIME DAILY         Prior labs and Blood pressures:  BP Readings from Last 3 Encounters:   08/21/24 (!) 141/78   08/21/24 135/82   06/07/24 (!) 142/90     Lab Results   Component Value Date/Time     05/24/2024 09:12 AM    K 4.6 05/24/2024 09:12 AM     05/24/2024 09:12 AM    CO2 28 05/24/2024 09:12 AM    BUN 16 05/24/2024 09:12 AM    GFRAA >60 10/20/2021 11:26 AM     No results found for: \"HBA1C\", \"[id number]\"  Lab Results   Component Value Date/Time    CHOL 163 05/24/2024 09:12 AM    HDL 65 05/24/2024 09:12 AM    LDL 85.6 05/24/2024 09:12 AM    .2 11/14/2023 08:26 AM    VLDL 12.4 05/24/2024 09:12 AM     No results found for: \"VITD3\"    Lab Results   Component Value Date/Time    TSH 1.35 01/17/2023 04:10 PM

## 2024-12-31 DIAGNOSIS — I10 UNCONTROLLED HYPERTENSION: ICD-10-CM

## 2024-12-31 RX ORDER — FINASTERIDE 1 MG/1
TABLET, FILM COATED ORAL
Qty: 30 TABLET | Refills: 0 | Status: SHIPPED | OUTPATIENT
Start: 2024-12-31

## 2024-12-31 RX ORDER — OLMESARTAN MEDOXOMIL 20 MG/1
20 TABLET ORAL DAILY
Qty: 30 TABLET | Refills: 0 | Status: SHIPPED | OUTPATIENT
Start: 2024-12-31

## 2024-12-31 NOTE — TELEPHONE ENCOUNTER
PCP: Alley Campoverde APRN - NP    Last appt: 5/31/2024   Future Appointments   Date Time Provider Department Center   1/22/2025 10:00 AM Alley Campoverde APRN - NP [facility] E[facility] DEP       Requested Prescriptions     Pending Prescriptions Disp Refills    olmesartan (BENICAR) 20 MG tablet [Pharmacy Med Name: Olmesartan Medoxomil Oral Tablet 20 MG] 90 tablet 0     Sig: TAKE 1 TABLET BY MOUTH ONE TIME DAILY         Prior labs and Blood pressures:  BP Readings from Last 3 Encounters:   08/21/24 (!) 141/78   08/21/24 135/82   06/07/24 (!) 142/90     Lab Results   Component Value Date/Time     05/24/2024 09:12 AM    K 4.6 05/24/2024 09:12 AM     05/24/2024 09:12 AM    CO2 28 05/24/2024 09:12 AM    BUN 16 05/24/2024 09:12 AM    GFRAA >60 10/20/2021 11:26 AM     No results found for: \"HBA1C\", \"[id number]\"  Lab Results   Component Value Date/Time    CHOL 163 05/24/2024 09:12 AM    HDL 65 05/24/2024 09:12 AM    LDL 85.6 05/24/2024 09:12 AM    .2 11/14/2023 08:26 AM    VLDL 12.4 05/24/2024 09:12 AM     No results found for: \"VITD3\"    Lab Results   Component Value Date/Time    TSH 1.35 01/17/2023 04:10 PM

## 2025-01-20 SDOH — ECONOMIC STABILITY: INCOME INSECURITY: IN THE LAST 12 MONTHS, WAS THERE A TIME WHEN YOU WERE NOT ABLE TO PAY THE MORTGAGE OR RENT ON TIME?: NO

## 2025-01-20 SDOH — ECONOMIC STABILITY: FOOD INSECURITY: WITHIN THE PAST 12 MONTHS, THE FOOD YOU BOUGHT JUST DIDN'T LAST AND YOU DIDN'T HAVE MONEY TO GET MORE.: NEVER TRUE

## 2025-01-20 SDOH — ECONOMIC STABILITY: TRANSPORTATION INSECURITY
IN THE PAST 12 MONTHS, HAS THE LACK OF TRANSPORTATION KEPT YOU FROM MEDICAL APPOINTMENTS OR FROM GETTING MEDICATIONS?: NO

## 2025-01-20 SDOH — ECONOMIC STABILITY: TRANSPORTATION INSECURITY
IN THE PAST 12 MONTHS, HAS LACK OF TRANSPORTATION KEPT YOU FROM MEETINGS, WORK, OR FROM GETTING THINGS NEEDED FOR DAILY LIVING?: NO

## 2025-01-20 SDOH — ECONOMIC STABILITY: FOOD INSECURITY: WITHIN THE PAST 12 MONTHS, YOU WORRIED THAT YOUR FOOD WOULD RUN OUT BEFORE YOU GOT MONEY TO BUY MORE.: NEVER TRUE

## 2025-01-22 ENCOUNTER — OFFICE VISIT (OUTPATIENT)
Age: 60
End: 2025-01-22
Payer: COMMERCIAL

## 2025-01-22 VITALS
WEIGHT: 229 LBS | HEART RATE: 51 BPM | BODY MASS INDEX: 31.02 KG/M2 | HEIGHT: 72 IN | DIASTOLIC BLOOD PRESSURE: 71 MMHG | OXYGEN SATURATION: 98 % | TEMPERATURE: 98.4 F | RESPIRATION RATE: 16 BRPM | SYSTOLIC BLOOD PRESSURE: 115 MMHG

## 2025-01-22 DIAGNOSIS — R00.2 PALPITATIONS: ICD-10-CM

## 2025-01-22 DIAGNOSIS — R73.01 IMPAIRED FASTING GLUCOSE: ICD-10-CM

## 2025-01-22 DIAGNOSIS — Z86.79 HISTORY OF PSVT (PAROXYSMAL SUPRAVENTRICULAR TACHYCARDIA): ICD-10-CM

## 2025-01-22 DIAGNOSIS — E78.2 MIXED HYPERLIPIDEMIA: Primary | ICD-10-CM

## 2025-01-22 DIAGNOSIS — Z12.5 PROSTATE CANCER SCREENING: ICD-10-CM

## 2025-01-22 DIAGNOSIS — F41.9 ANXIETY: ICD-10-CM

## 2025-01-22 DIAGNOSIS — I10 PRIMARY HYPERTENSION: ICD-10-CM

## 2025-01-22 DIAGNOSIS — Z80.51 FAMILY HISTORY OF RENAL CANCER: ICD-10-CM

## 2025-01-22 DIAGNOSIS — Z28.21 INFLUENZA VACCINATION DECLINED: ICD-10-CM

## 2025-01-22 PROCEDURE — G8417 CALC BMI ABV UP PARAM F/U: HCPCS | Performed by: NURSE PRACTITIONER

## 2025-01-22 PROCEDURE — 99214 OFFICE O/P EST MOD 30 MIN: CPT | Performed by: NURSE PRACTITIONER

## 2025-01-22 PROCEDURE — 1036F TOBACCO NON-USER: CPT | Performed by: NURSE PRACTITIONER

## 2025-01-22 PROCEDURE — 3074F SYST BP LT 130 MM HG: CPT | Performed by: NURSE PRACTITIONER

## 2025-01-22 PROCEDURE — 3017F COLORECTAL CA SCREEN DOC REV: CPT | Performed by: NURSE PRACTITIONER

## 2025-01-22 PROCEDURE — G8427 DOCREV CUR MEDS BY ELIG CLIN: HCPCS | Performed by: NURSE PRACTITIONER

## 2025-01-22 PROCEDURE — 3078F DIAST BP <80 MM HG: CPT | Performed by: NURSE PRACTITIONER

## 2025-01-22 RX ORDER — OLMESARTAN MEDOXOMIL 20 MG/1
20 TABLET ORAL DAILY
Qty: 90 TABLET | Refills: 1 | Status: SHIPPED | OUTPATIENT
Start: 2025-01-22

## 2025-01-22 RX ORDER — CLONAZEPAM 0.5 MG/1
0.5 TABLET ORAL NIGHTLY PRN
Qty: 30 TABLET | Refills: 0 | Status: SHIPPED | OUTPATIENT
Start: 2025-01-22 | End: 2025-02-21

## 2025-01-22 RX ORDER — ATORVASTATIN CALCIUM 10 MG/1
10 TABLET, FILM COATED ORAL DAILY
Qty: 90 TABLET | Refills: 1 | Status: SHIPPED | OUTPATIENT
Start: 2025-01-22

## 2025-01-22 NOTE — PROGRESS NOTES
[facility]  Clinic Note     Jeffrey G Sacks Sr. (: 1965) is a 59 y.o. male, established patient, here for evaluation of the following chief complaint(s):  Medication Refill and Follow-up       ASSESSMENT/PLAN:  1. Mixed hyperlipidemia  -     Comprehensive Metabolic Panel; Future  -     Lipid Panel; Future  -     atorvastatin (LIPITOR) 10 MG tablet; Take 1 tablet by mouth daily, Disp-90 tablet, R-1Normal  Lab Results   Component Value Date    CHOL 163 2024    TRIG 62 2024    HDL 65 2024    LDL 85.6 2024    VLDL 12.4 2024    CHOLHDLRATIO 2.5 2024       2. Palpitations  - More frequent. Has not seen cardiology/EP for quite some time.   -     Comprehensive Metabolic Panel; Future  -     CBC with Auto Differential; Future  -     TSH + Free T4 Panel; Future  -     [facility] - Helder Harper MD, Cardiac Electrophysiology, [place] ([facility])  -     Extended cardiac holter monitor (3 days-14 day); Future    3. History of PSVT (paroxysmal supraventricular tachycardia)  -     [facility] Helder Olivera MD, Cardiac Electrophysiology, [place] ([facility])    4. Primary hypertension  - Controlled.  No adjustment at this time.  -     Comprehensive Metabolic Panel; Future  -     olmesartan (BENICAR) 20 MG tablet; Take 1 tablet by mouth daily, Disp-90 tablet, R-1Normal  -Does not need refill of amlodipine at this time    5. Anxiety  -Prescribed > 20yrs ago.  Uses medication on a rare basis.  -     clonazePAM (KLONOPIN) 0.5 MG tablet; Take 1 tablet by mouth nightly as needed for Anxiety for up to 30 days. Max Daily Amount: 0.5 mg, Disp-30 tablet, R-0Normal  - reviewed.  No fills in the last 3 years.  -We discussed temporary refill.  Discussed and counseled on long-term use of benzodiazepine.  Consider alternative.  He is open to this.    6. Prostate cancer screening  -     PSA Screening; Future    7. Impaired fasting glucose  -     Hemoglobin A1C; Future  -A1c = 5.6 on

## 2025-01-22 NOTE — PROGRESS NOTES
Chief Complaint   Patient presents with    Medication Refill    Follow-up         \"Have you been to the ER, urgent care clinic since your last visit?  Hospitalized since your last visit?\"    ER - Unexplained cramping  Patient first - sinus infection     “Have you seen or consulted any other health care providers outside of [facility] since your last visit?”    Ortho - left hip             Click Here for Release of Records Request           6/7/2024     8:49 AM   PHQ-9    Little interest or pleasure in doing things 0   Feeling down, depressed, or hopeless 0   PHQ-2 Score 0   PHQ-9 Total Score 0           Financial Resource Strain: Low Risk  (11/9/2023)    Overall Financial Resource Strain (CARDIA)     Difficulty of Paying Living Expenses: Not very hard      Food Insecurity: No Food Insecurity (1/20/2025)    Hunger Vital Sign     Worried About Running Out of Food in the Last Year: Never true     Ran Out of Food in the Last Year: Never true          Health Maintenance Due   Topic Date Due    HIV screen  Never done    Hepatitis B vaccine (1 of 3 - 19+ 3-dose series) Never done    Shingles vaccine (1 of 2) Never done    Flu vaccine (1) 08/01/2024    COVID-19 Vaccine (2 - 2023-24 season) 09/01/2024

## 2025-01-30 ENCOUNTER — LAB (OUTPATIENT)
Age: 60
End: 2025-01-30

## 2025-01-30 DIAGNOSIS — I10 PRIMARY HYPERTENSION: ICD-10-CM

## 2025-01-30 DIAGNOSIS — R00.2 PALPITATIONS: ICD-10-CM

## 2025-01-30 DIAGNOSIS — E78.2 MIXED HYPERLIPIDEMIA: ICD-10-CM

## 2025-01-30 DIAGNOSIS — R73.01 IMPAIRED FASTING GLUCOSE: ICD-10-CM

## 2025-01-30 DIAGNOSIS — Z12.5 PROSTATE CANCER SCREENING: ICD-10-CM

## 2025-01-30 LAB
T4 FREE SERPL-MCNC: 0.9 NG/DL (ref 0.8–1.5)
TSH SERPL DL<=0.05 MIU/L-ACNC: 2.12 UIU/ML (ref 0.36–3.74)

## 2025-01-31 DIAGNOSIS — L65.9 HAIR THINNING: ICD-10-CM

## 2025-01-31 LAB
ALBUMIN SERPL-MCNC: 4.3 G/DL (ref 3.5–5)
ALBUMIN/GLOB SERPL: 1.5 (ref 1.1–2.2)
ALP SERPL-CCNC: 71 U/L (ref 45–117)
ALT SERPL-CCNC: 35 U/L (ref 12–78)
ANION GAP SERPL CALC-SCNC: 6 MMOL/L (ref 2–12)
AST SERPL-CCNC: 19 U/L (ref 15–37)
BASOPHILS # BLD: 0.06 K/UL (ref 0–0.1)
BASOPHILS NFR BLD: 0.7 % (ref 0–1)
BILIRUB SERPL-MCNC: 0.5 MG/DL (ref 0.2–1)
BUN SERPL-MCNC: 21 MG/DL (ref 6–20)
BUN/CREAT SERPL: 20 (ref 12–20)
CALCIUM SERPL-MCNC: 9.7 MG/DL (ref 8.5–10.1)
CHLORIDE SERPL-SCNC: 104 MMOL/L (ref 97–108)
CHOLEST SERPL-MCNC: 175 MG/DL
CO2 SERPL-SCNC: 28 MMOL/L (ref 21–32)
CREAT SERPL-MCNC: 1.05 MG/DL (ref 0.7–1.3)
DIFFERENTIAL METHOD BLD: NORMAL
EOSINOPHIL # BLD: 0.14 K/UL (ref 0–0.4)
EOSINOPHIL NFR BLD: 1.7 % (ref 0–7)
ERYTHROCYTE [DISTWIDTH] IN BLOOD BY AUTOMATED COUNT: 12.8 % (ref 11.5–14.5)
EST. AVERAGE GLUCOSE BLD GHB EST-MCNC: 111 MG/DL
GLOBULIN SER CALC-MCNC: 2.9 G/DL (ref 2–4)
GLUCOSE SERPL-MCNC: 93 MG/DL (ref 65–100)
HBA1C MFR BLD: 5.5 % (ref 4–5.6)
HCT VFR BLD AUTO: 46.6 % (ref 36.6–50.3)
HDLC SERPL-MCNC: 57 MG/DL
HDLC SERPL: 3.1 (ref 0–5)
HGB BLD-MCNC: 15 G/DL (ref 12.1–17)
IMM GRANULOCYTES # BLD AUTO: 0.03 K/UL (ref 0–0.04)
IMM GRANULOCYTES NFR BLD AUTO: 0.4 % (ref 0–0.5)
LDLC SERPL CALC-MCNC: 94.4 MG/DL (ref 0–100)
LYMPHOCYTES # BLD: 1.57 K/UL (ref 0.8–3.5)
LYMPHOCYTES NFR BLD: 18.8 % (ref 12–49)
MCH RBC QN AUTO: 29.1 PG (ref 26–34)
MCHC RBC AUTO-ENTMCNC: 32.2 G/DL (ref 30–36.5)
MCV RBC AUTO: 90.5 FL (ref 80–99)
MONOCYTES # BLD: 0.47 K/UL (ref 0–1)
MONOCYTES NFR BLD: 5.6 % (ref 5–13)
NEUTS SEG # BLD: 6.07 K/UL (ref 1.8–8)
NEUTS SEG NFR BLD: 72.8 % (ref 32–75)
NRBC # BLD: 0 K/UL (ref 0–0.01)
NRBC BLD-RTO: 0 PER 100 WBC
PLATELET # BLD AUTO: 293 K/UL (ref 150–400)
PMV BLD AUTO: 11.2 FL (ref 8.9–12.9)
POTASSIUM SERPL-SCNC: 4.9 MMOL/L (ref 3.5–5.1)
PROT SERPL-MCNC: 7.2 G/DL (ref 6.4–8.2)
PSA SERPL-MCNC: 0.2 NG/ML (ref 0.01–4)
RBC # BLD AUTO: 5.15 M/UL (ref 4.1–5.7)
SODIUM SERPL-SCNC: 138 MMOL/L (ref 136–145)
TRIGL SERPL-MCNC: 118 MG/DL
VLDLC SERPL CALC-MCNC: 23.6 MG/DL
WBC # BLD AUTO: 8.3 K/UL (ref 4.1–11.1)

## 2025-01-31 RX ORDER — FINASTERIDE 1 MG/1
TABLET, FILM COATED ORAL
Qty: 90 TABLET | Refills: 1 | Status: SHIPPED | OUTPATIENT
Start: 2025-01-31

## 2025-01-31 NOTE — TELEPHONE ENCOUNTER
PCP: Alley Campoverde, APRN - NP    Last appt: 1/22/2025     Future Appointments   Date Time Provider Department Center   3/25/2025  8:00 AM Sita Vera, MD [facility]       Requested Prescriptions     Pending Prescriptions Disp Refills    finasteride (PROPECIA) 1 MG tablet [Pharmacy Med Name: Finasteride Oral Tablet 1 MG] 30 tablet 0     Sig: TAKE 1 TABLET BY MOUTH ONE TIME DAILY       Prior labs and Blood pressures:  BP Readings from Last 3 Encounters:   01/22/25 115/71   08/21/24 (!) 141/78   08/21/24 135/82     Lab Results   Component Value Date/Time     05/24/2024 09:12 AM    K 4.6 05/24/2024 09:12 AM     05/24/2024 09:12 AM    CO2 28 05/24/2024 09:12 AM    BUN 16 05/24/2024 09:12 AM    GFRAA >60 10/20/2021 11:26 AM     No results found for: \"HBA1C\", \"[id number]\"  Lab Results   Component Value Date/Time    CHOL 163 05/24/2024 09:12 AM    HDL 65 05/24/2024 09:12 AM    LDL 85.6 05/24/2024 09:12 AM    .2 11/14/2023 08:26 AM    VLDL 12.4 05/24/2024 09:12 AM     No results found for: \"VITD3\"    Lab Results   Component Value Date/Time    TSH 2.12 01/30/2025 08:17 AM

## 2025-03-07 ENCOUNTER — HOSPITAL ENCOUNTER (OUTPATIENT)
Age: 60
Discharge: HOME | End: 2025-03-10
Payer: COMMERCIAL

## 2025-03-07 DIAGNOSIS — Z80.51 FAMILY HISTORY OF RENAL CANCER: ICD-10-CM

## 2025-03-07 PROCEDURE — 76770 US EXAM ABDO BACK WALL COMP: CPT

## 2025-03-14 ENCOUNTER — RESULTS FOLLOW-UP (OUTPATIENT)
Age: 60
End: 2025-03-14

## 2025-03-24 PROBLEM — I49.5 SSS (SICK SINUS SYNDROME) (HCC): Status: ACTIVE | Noted: 2025-03-24

## 2025-03-24 NOTE — PROGRESS NOTES
Cardiac Electrophysiology OFFICE Consultation Note       Assessment/Plan:   1. SSS (sick sinus syndrome) (HCC)  -     EKG 12 Lead  -     ECHO COMPLETE ADULT (TTE) W OR WO CONTR- Clinic Performed; Future  -     Exercise stress test; Future  2. History of PSVT (paroxysmal supraventricular tachycardia)  -     EKG 12 Lead  -     ECHO COMPLETE ADULT (TTE) W OR WO CONTR- Clinic Performed; Future  -     Exercise stress test; Future  3. Primary hypertension  -     EKG 12 Lead  -     ECHO COMPLETE ADULT (TTE) W OR WO CONTR- Clinic Performed; Future  -     Exercise stress test; Future  4. Mixed hyperlipidemia  -     EKG 12 Lead  -     ECHO COMPLETE ADULT (TTE) W OR WO CONTR- Clinic Performed; Future  -     Exercise stress test; Future       Primary Cardiologist: Brady      Sick sinus syndrome  Evidence of sick sinus syndrome a resting heart rate in the 40s.  EKG with NY interval 185 ms and QRS of 88 ms.  No evidence of preexcitation  - Reporting fatigue and dyspnea on exertion  - No prior history of syncope  - We spoke regarding evidence of sick sinus syndrome and potential chronotropic incompetence as a cause of his symptoms.  Will need further workup  - Obtain 1 week Zio patch monitor  - Obtain echocardiogram for dyspnea on exertion  - Obtain treadmill exercise stress test to rule out chronotropic incompetence  - Currently not on any maryjane blocking agents  - Info regarding pacemaker provided  - If workup is unrevealing, we will continue conservative management  - He has any evidence of sick sinus syndrome, symptomatic bradycardia or chronotropic incompetence, could benefit from dual-chamber pacemaker in the future  - Follow-up with EP after testing    SVT  History of SVT status post 2 prior remote ablations 30 years ago in [place].  Suggestive of prior accessory pathway ablations  - Not on any maryjane blocking agent  - EKG without any preexcitation  - Obtain event monitor as noted above    Hypertension  Insert

## 2025-03-25 ENCOUNTER — TELEPHONE (OUTPATIENT)
Age: 60
End: 2025-03-25

## 2025-03-25 ENCOUNTER — OFFICE VISIT (OUTPATIENT)
Age: 60
End: 2025-03-25
Payer: COMMERCIAL

## 2025-03-25 VITALS
WEIGHT: 234 LBS | HEIGHT: 72 IN | DIASTOLIC BLOOD PRESSURE: 86 MMHG | HEART RATE: 91 BPM | BODY MASS INDEX: 31.69 KG/M2 | OXYGEN SATURATION: 99 % | SYSTOLIC BLOOD PRESSURE: 128 MMHG

## 2025-03-25 DIAGNOSIS — E78.2 MIXED HYPERLIPIDEMIA: ICD-10-CM

## 2025-03-25 DIAGNOSIS — I10 PRIMARY HYPERTENSION: ICD-10-CM

## 2025-03-25 DIAGNOSIS — I49.5 SSS (SICK SINUS SYNDROME) (HCC): Primary | ICD-10-CM

## 2025-03-25 DIAGNOSIS — Z86.79 HISTORY OF PSVT (PAROXYSMAL SUPRAVENTRICULAR TACHYCARDIA): ICD-10-CM

## 2025-03-25 PROCEDURE — 3017F COLORECTAL CA SCREEN DOC REV: CPT | Performed by: INTERNAL MEDICINE

## 2025-03-25 PROCEDURE — 3079F DIAST BP 80-89 MM HG: CPT | Performed by: INTERNAL MEDICINE

## 2025-03-25 PROCEDURE — 93000 ELECTROCARDIOGRAM COMPLETE: CPT | Performed by: INTERNAL MEDICINE

## 2025-03-25 PROCEDURE — 99214 OFFICE O/P EST MOD 30 MIN: CPT | Performed by: INTERNAL MEDICINE

## 2025-03-25 PROCEDURE — 1036F TOBACCO NON-USER: CPT | Performed by: INTERNAL MEDICINE

## 2025-03-25 PROCEDURE — G8417 CALC BMI ABV UP PARAM F/U: HCPCS | Performed by: INTERNAL MEDICINE

## 2025-03-25 PROCEDURE — 3074F SYST BP LT 130 MM HG: CPT | Performed by: INTERNAL MEDICINE

## 2025-03-25 PROCEDURE — G8427 DOCREV CUR MEDS BY ELIG CLIN: HCPCS | Performed by: INTERNAL MEDICINE

## 2025-03-25 PROCEDURE — G2211 COMPLEX E/M VISIT ADD ON: HCPCS | Performed by: INTERNAL MEDICINE

## 2025-03-25 ASSESSMENT — PATIENT HEALTH QUESTIONNAIRE - PHQ9
SUM OF ALL RESPONSES TO PHQ QUESTIONS 1-9: 0
1. LITTLE INTEREST OR PLEASURE IN DOING THINGS: NOT AT ALL
SUM OF ALL RESPONSES TO PHQ QUESTIONS 1-9: 0
2. FEELING DOWN, DEPRESSED OR HOPELESS: NOT AT ALL
SUM OF ALL RESPONSES TO PHQ QUESTIONS 1-9: 0
SUM OF ALL RESPONSES TO PHQ QUESTIONS 1-9: 0

## 2025-03-25 NOTE — PATIENT INSTRUCTIONS
Obtain 1 week Ziopatch monitor - this will be mailed to you  Obtain echocardiogram  Obtain exercise treadmill stress test    FU after testing

## 2025-03-25 NOTE — TELEPHONE ENCOUNTER
Enrolled with [employer]io Patch - Ordered and being shipped to patient's home address on file.  ETA within 5-7 Business Days.

## 2025-03-25 NOTE — PROGRESS NOTES
1. Have you been to the ER, urgent care clinic since your last visit?  Hospitalized since your last visit?Yes Patient 1st 1/2025    2. Have you seen or consulted any other health care providers outside of the [facility] since your last visit?  Include any pap smears or colon screening. no

## 2025-04-09 ENCOUNTER — OFFICE VISIT (OUTPATIENT)
Age: 60
End: 2025-04-09
Payer: COMMERCIAL

## 2025-04-09 VITALS
WEIGHT: 235.2 LBS | RESPIRATION RATE: 16 BRPM | OXYGEN SATURATION: 97 % | TEMPERATURE: 98.2 F | HEART RATE: 61 BPM | BODY MASS INDEX: 31.86 KG/M2 | HEIGHT: 72 IN | DIASTOLIC BLOOD PRESSURE: 72 MMHG | SYSTOLIC BLOOD PRESSURE: 134 MMHG

## 2025-04-09 DIAGNOSIS — J30.89 NON-SEASONAL ALLERGIC RHINITIS DUE TO OTHER ALLERGIC TRIGGER: ICD-10-CM

## 2025-04-09 DIAGNOSIS — M54.2 NECK PAIN: Primary | ICD-10-CM

## 2025-04-09 PROCEDURE — 1036F TOBACCO NON-USER: CPT | Performed by: NURSE PRACTITIONER

## 2025-04-09 PROCEDURE — G8427 DOCREV CUR MEDS BY ELIG CLIN: HCPCS | Performed by: NURSE PRACTITIONER

## 2025-04-09 PROCEDURE — G8417 CALC BMI ABV UP PARAM F/U: HCPCS | Performed by: NURSE PRACTITIONER

## 2025-04-09 PROCEDURE — 3078F DIAST BP <80 MM HG: CPT | Performed by: NURSE PRACTITIONER

## 2025-04-09 PROCEDURE — 99213 OFFICE O/P EST LOW 20 MIN: CPT | Performed by: NURSE PRACTITIONER

## 2025-04-09 PROCEDURE — 3075F SYST BP GE 130 - 139MM HG: CPT | Performed by: NURSE PRACTITIONER

## 2025-04-09 PROCEDURE — 3017F COLORECTAL CA SCREEN DOC REV: CPT | Performed by: NURSE PRACTITIONER

## 2025-04-09 RX ORDER — MONTELUKAST SODIUM 10 MG/1
10 TABLET ORAL DAILY
Qty: 90 TABLET | Refills: 1 | Status: SHIPPED | OUTPATIENT
Start: 2025-04-09

## 2025-04-09 ASSESSMENT — ENCOUNTER SYMPTOMS
SORE THROAT: 0
COUGH: 0
SHORTNESS OF BREATH: 0

## 2025-04-09 NOTE — PROGRESS NOTES
Chief Complaint   Patient presents with    Neck Pain     Symptoms started a week ago, 1 - pain scale, feels like his artery is full - denies trauma or injury -denies numbness/tingling - OTC Advil with minimal improvement          \"Have you been to the ER, urgent care clinic since your last visit?  Hospitalized since your last visit?\"    NO    “Have you seen or consulted any other health care providers outside of [facility] since your last visit?”    NO          Click Here for Release of Records Request           3/25/2025     7:55 AM   PHQ-9    Little interest or pleasure in doing things 0   Feeling down, depressed, or hopeless 0   PHQ-2 Score 0   PHQ-9 Total Score 0           Financial Resource Strain: Low Risk  (11/9/2023)    Overall Financial Resource Strain (CARDIA)     Difficulty of Paying Living Expenses: Not very hard      Food Insecurity: No Food Insecurity (1/20/2025)    Hunger Vital Sign     Worried About Running Out of Food in the Last Year: Never true     Ran Out of Food in the Last Year: Never true          Health Maintenance Due   Topic Date Due    HIV screen  Never done    Hepatitis B vaccine (1 of 3 - 19+ 3-dose series) Never done    Shingles vaccine (1 of 2) Never done    Pneumococcal 50+ years Vaccine (1 of 1 - PCV) Never done    COVID-19 Vaccine (2 - 2024-25 season) 09/01/2024       
Exam      Assessment and Plan    1. Neck pain  2. Non-seasonal allergic rhinitis due to other allergic trigger  -     montelukast (SINGULAIR) 10 MG tablet; Take 1 tablet by mouth daily, Disp-90 tablet, R-1Normal     Assessment & Plan  1. Neck pain  - Symptoms may be due to reactive lymph nodes with recent pollination. No bruit or tenderness along the carotid. Tenderness over cervical lymph nodes with no lymphadenopathy. DDX includes referred throat pain from PND or GERD.  - Switch from Zyrtec to Allegra, may alleviate symptoms in 1-2 weeks.  - Initiate Singulair, improvement may take several weeks.    2. Allergic rhinitis: Chronic.  - Persistent symptoms despite Flonase and Zyrtec.  - Prescribed Singulair, to be taken indefinitely if necessary.  - Continue current antihistamine regimen, add Singulair.  - Avoid Sudafed-containing medications.  - Discontinue Singulair if adverse effects like suicidal thoughts occur.    3. Gastroesophageal reflux disease: Chronic.  - Initiate Pepcid 20 mg twice daily.  - Consider omeprazole if ineffective.  - Pursue weight loss and dietary modifications.  - Inform Angelia if switching to omeprazole for annual monitoring of B12 and magnesium levels.    Follow-up  - Send Singulair prescription to [facility].      No follow-ups on file.     Diagnosis and plan discussed with patient who verbalized understanding and agrees with the plan.

## 2025-04-22 ENCOUNTER — RESULTS FOLLOW-UP (OUTPATIENT)
Age: 60
End: 2025-04-22

## 2025-04-30 ENCOUNTER — RESULTS FOLLOW-UP (OUTPATIENT)
Age: 60
End: 2025-04-30

## 2025-05-30 ENCOUNTER — OFFICE VISIT (OUTPATIENT)
Age: 60
End: 2025-05-30
Payer: COMMERCIAL

## 2025-05-30 VITALS
HEART RATE: 46 BPM | HEIGHT: 72 IN | OXYGEN SATURATION: 99 % | WEIGHT: 225 LBS | BODY MASS INDEX: 30.48 KG/M2 | SYSTOLIC BLOOD PRESSURE: 120 MMHG | DIASTOLIC BLOOD PRESSURE: 80 MMHG

## 2025-05-30 DIAGNOSIS — I10 PRIMARY HYPERTENSION: ICD-10-CM

## 2025-05-30 DIAGNOSIS — E66.811 OBESITY (BMI 30.0-34.9): ICD-10-CM

## 2025-05-30 DIAGNOSIS — I49.5 SSS (SICK SINUS SYNDROME) (HCC): Primary | ICD-10-CM

## 2025-05-30 DIAGNOSIS — I47.10 PSVT (PAROXYSMAL SUPRAVENTRICULAR TACHYCARDIA): ICD-10-CM

## 2025-05-30 PROCEDURE — G8417 CALC BMI ABV UP PARAM F/U: HCPCS

## 2025-05-30 PROCEDURE — 3074F SYST BP LT 130 MM HG: CPT

## 2025-05-30 PROCEDURE — 1036F TOBACCO NON-USER: CPT

## 2025-05-30 PROCEDURE — G8427 DOCREV CUR MEDS BY ELIG CLIN: HCPCS

## 2025-05-30 PROCEDURE — 3079F DIAST BP 80-89 MM HG: CPT

## 2025-05-30 PROCEDURE — 93000 ELECTROCARDIOGRAM COMPLETE: CPT

## 2025-05-30 PROCEDURE — 3017F COLORECTAL CA SCREEN DOC REV: CPT

## 2025-05-30 PROCEDURE — 99214 OFFICE O/P EST MOD 30 MIN: CPT

## 2025-05-30 NOTE — PROGRESS NOTES
valid procedures specified.  No valid procedures specified.  [unfilled]          Thank you for involving me in this patient's care and please call with further concerns or questions.      ________________________________________  Willie Escoto NP  Cardiac Electrophysiology  [facility] [place]  [address]                         [place] [address] [phone]     [address] [facility]  [place] [address] [phone]

## 2025-06-13 DIAGNOSIS — I10 ESSENTIAL (PRIMARY) HYPERTENSION: ICD-10-CM

## 2025-06-13 RX ORDER — AMLODIPINE BESYLATE 5 MG/1
5 TABLET ORAL DAILY
Qty: 90 TABLET | Refills: 3 | Status: SHIPPED | OUTPATIENT
Start: 2025-06-13

## 2025-06-13 NOTE — TELEPHONE ENCOUNTER
Chief Complaint   Patient presents with    Medication Refill     Hypertension  - BP well controlled. Continue amlodipine 5 mg daily and Benicar 20 mg daily    Ordered Per  Verbal Order.     Last appt: Visit date not found   Future Appointments   Date Time Provider Department Center   6/2/2026  9:00 AM [facility] ECHO 1 [facility]   6/2/2026 10:00 AM Sita Vera MD [facility]       Requested Prescriptions      No prescriptions requested or ordered in this encounter         Prior labs and Blood pressures:  BP Readings from Last 3 Encounters:   05/30/25 120/80   04/25/25 (!) 140/92   04/09/25 134/72     Lab Results   Component Value Date/Time     01/30/2025 08:17 AM    K 4.9 01/30/2025 08:17 AM     01/30/2025 08:17 AM    CO2 28 01/30/2025 08:17 AM    BUN 21 01/30/2025 08:17 AM    GFRAA >60 10/20/2021 11:26 AM     No results found for: \"HBA1C\", \"[id number]\"  Lab Results   Component Value Date/Time    CHOL 175 01/30/2025 08:17 AM    HDL 57 01/30/2025 08:17 AM    LDL 94.4 01/30/2025 08:17 AM    .2 11/14/2023 08:26 AM    VLDL 23.6 01/30/2025 08:17 AM         Lab Results   Component Value Date/Time    TSH 2.12 01/30/2025 08:17 AM

## 2025-07-02 DIAGNOSIS — I10 PRIMARY HYPERTENSION: ICD-10-CM

## 2025-07-02 RX ORDER — OLMESARTAN MEDOXOMIL 20 MG/1
20 TABLET ORAL DAILY
Qty: 90 TABLET | Refills: 0 | Status: SHIPPED | OUTPATIENT
Start: 2025-07-02

## 2025-07-02 NOTE — TELEPHONE ENCOUNTER
PCP: Alley Campoverde, APRN - NP    Last appt: 4/9/2025   Future Appointments   Date Time Provider Department Center   6/2/2026  9:00 AM [facility] ECHO 1 [facility]   6/2/2026 10:00 AM Sita Vera MD [facility]       Requested Prescriptions     Pending Prescriptions Disp Refills    olmesartan (BENICAR) 20 MG tablet [Pharmacy Med Name: Olmesartan Medoxomil Oral Tablet 20 MG] 90 tablet 0     Sig: Take 1 tablet by mouth daily

## 2025-08-15 DIAGNOSIS — L65.9 HAIR THINNING: ICD-10-CM

## 2025-08-15 RX ORDER — FINASTERIDE 1 MG/1
1 TABLET, FILM COATED ORAL DAILY
Qty: 30 TABLET | Refills: 0 | Status: SHIPPED | OUTPATIENT
Start: 2025-08-15